# Patient Record
Sex: FEMALE | Race: ASIAN | NOT HISPANIC OR LATINO | URBAN - METROPOLITAN AREA
[De-identification: names, ages, dates, MRNs, and addresses within clinical notes are randomized per-mention and may not be internally consistent; named-entity substitution may affect disease eponyms.]

---

## 2017-01-03 ENCOUNTER — AMBULATORY - HEALTHEAST (OUTPATIENT)
Dept: LAB | Facility: CLINIC | Age: 27
End: 2017-01-03

## 2017-01-03 ENCOUNTER — COMMUNICATION - HEALTHEAST (OUTPATIENT)
Dept: FAMILY MEDICINE | Facility: CLINIC | Age: 27
End: 2017-01-03

## 2017-01-03 DIAGNOSIS — O03.9 MISCARRIAGE: ICD-10-CM

## 2017-06-26 ENCOUNTER — COMMUNICATION - HEALTHEAST (OUTPATIENT)
Dept: FAMILY MEDICINE | Facility: CLINIC | Age: 27
End: 2017-06-26

## 2017-06-29 ENCOUNTER — OFFICE VISIT - HEALTHEAST (OUTPATIENT)
Dept: FAMILY MEDICINE | Facility: CLINIC | Age: 27
End: 2017-06-29

## 2017-06-29 DIAGNOSIS — N92.6 LATE PERIOD: ICD-10-CM

## 2017-06-29 DIAGNOSIS — Z3A.01 LESS THAN 8 WEEKS GESTATION OF PREGNANCY: ICD-10-CM

## 2017-06-29 ASSESSMENT — MIFFLIN-ST. JEOR: SCORE: 1101.9

## 2017-07-31 ENCOUNTER — PRENATAL OFFICE VISIT - HEALTHEAST (OUTPATIENT)
Dept: MIDWIFE SERVICES | Facility: CLINIC | Age: 27
End: 2017-07-31

## 2017-07-31 DIAGNOSIS — Z34.00 SUPERVISION OF NORMAL FIRST PREGNANCY: ICD-10-CM

## 2017-07-31 LAB — HIV 1+2 AB+HIV1 P24 AG SERPL QL IA: NEGATIVE

## 2017-07-31 ASSESSMENT — MIFFLIN-ST. JEOR: SCORE: 1096.91

## 2017-08-01 LAB
HBV SURFACE AG SERPL QL IA: NEGATIVE
SYPHILIS RPR SCREEN - HISTORICAL: NORMAL

## 2017-08-28 ENCOUNTER — PRENATAL OFFICE VISIT - HEALTHEAST (OUTPATIENT)
Dept: MIDWIFE SERVICES | Facility: CLINIC | Age: 27
End: 2017-08-28

## 2017-08-28 DIAGNOSIS — Z34.02 ENCOUNTER FOR SUPERVISION OF NORMAL FIRST PREGNANCY IN SECOND TRIMESTER: ICD-10-CM

## 2017-08-28 ASSESSMENT — MIFFLIN-ST. JEOR: SCORE: 1096

## 2017-09-25 ENCOUNTER — PRENATAL OFFICE VISIT - HEALTHEAST (OUTPATIENT)
Dept: MIDWIFE SERVICES | Facility: CLINIC | Age: 27
End: 2017-09-25

## 2017-09-25 DIAGNOSIS — Z00.00 HEALTH CARE MAINTENANCE: ICD-10-CM

## 2017-09-25 DIAGNOSIS — Z34.02 ENCOUNTER FOR SUPERVISION OF NORMAL FIRST PREGNANCY IN SECOND TRIMESTER: ICD-10-CM

## 2017-09-25 ASSESSMENT — MIFFLIN-ST. JEOR: SCORE: 1110.97

## 2017-10-10 ENCOUNTER — HOSPITAL ENCOUNTER (OUTPATIENT)
Dept: ULTRASOUND IMAGING | Facility: HOSPITAL | Age: 27
Discharge: HOME | End: 2017-10-10
Attending: ADVANCED PRACTICE MIDWIFE

## 2017-10-10 DIAGNOSIS — Z34.02 ENCOUNTER FOR SUPERVISION OF NORMAL FIRST PREGNANCY IN SECOND TRIMESTER: ICD-10-CM

## 2017-10-11 ENCOUNTER — AMBULATORY - HEALTHEAST (OUTPATIENT)
Dept: MIDWIFE SERVICES | Facility: CLINIC | Age: 27
End: 2017-10-11

## 2017-10-23 ENCOUNTER — PRENATAL OFFICE VISIT - HEALTHEAST (OUTPATIENT)
Dept: MIDWIFE SERVICES | Facility: CLINIC | Age: 27
End: 2017-10-23

## 2017-10-23 DIAGNOSIS — Z34.02 ENCOUNTER FOR SUPERVISION OF NORMAL FIRST PREGNANCY IN SECOND TRIMESTER: ICD-10-CM

## 2017-10-23 ASSESSMENT — MIFFLIN-ST. JEOR: SCORE: 1152.7

## 2017-11-20 ENCOUNTER — PRENATAL OFFICE VISIT - HEALTHEAST (OUTPATIENT)
Dept: MIDWIFE SERVICES | Facility: CLINIC | Age: 27
End: 2017-11-20

## 2017-11-20 DIAGNOSIS — Z34.02 ENCOUNTER FOR SUPERVISION OF NORMAL FIRST PREGNANCY IN SECOND TRIMESTER: ICD-10-CM

## 2017-11-20 ASSESSMENT — MIFFLIN-ST. JEOR: SCORE: 1184.45

## 2017-12-04 ENCOUNTER — PRENATAL OFFICE VISIT - HEALTHEAST (OUTPATIENT)
Dept: MIDWIFE SERVICES | Facility: CLINIC | Age: 27
End: 2017-12-04

## 2017-12-04 DIAGNOSIS — Z34.03 ENCOUNTER FOR SUPERVISION OF NORMAL FIRST PREGNANCY IN THIRD TRIMESTER: ICD-10-CM

## 2017-12-04 ASSESSMENT — MIFFLIN-ST. JEOR: SCORE: 1192.16

## 2017-12-06 LAB — SYPHILIS RPR SCREEN - HISTORICAL: NORMAL

## 2018-01-08 ENCOUNTER — PRENATAL OFFICE VISIT - HEALTHEAST (OUTPATIENT)
Dept: MIDWIFE SERVICES | Facility: CLINIC | Age: 28
End: 2018-01-08

## 2018-01-08 DIAGNOSIS — Z34.03 ENCOUNTER FOR SUPERVISION OF NORMAL FIRST PREGNANCY IN THIRD TRIMESTER: ICD-10-CM

## 2018-01-08 ASSESSMENT — MIFFLIN-ST. JEOR: SCORE: 1210.31

## 2018-01-22 ENCOUNTER — PRENATAL OFFICE VISIT - HEALTHEAST (OUTPATIENT)
Dept: MIDWIFE SERVICES | Facility: CLINIC | Age: 28
End: 2018-01-22

## 2018-01-22 DIAGNOSIS — Z34.03 ENCOUNTER FOR SUPERVISION OF NORMAL FIRST PREGNANCY IN THIRD TRIMESTER: ICD-10-CM

## 2018-01-22 ASSESSMENT — MIFFLIN-ST. JEOR: SCORE: 1232.99

## 2018-01-29 ENCOUNTER — COMMUNICATION - HEALTHEAST (OUTPATIENT)
Dept: OBGYN | Facility: CLINIC | Age: 28
End: 2018-01-29

## 2018-01-29 ENCOUNTER — PRENATAL OFFICE VISIT - HEALTHEAST (OUTPATIENT)
Dept: MIDWIFE SERVICES | Facility: CLINIC | Age: 28
End: 2018-01-29

## 2018-01-29 DIAGNOSIS — Z34.03 ENCOUNTER FOR SUPERVISION OF NORMAL FIRST PREGNANCY IN THIRD TRIMESTER: ICD-10-CM

## 2018-01-29 LAB — HGB BLD-MCNC: 12.5 G/DL (ref 12–16)

## 2018-01-29 ASSESSMENT — MIFFLIN-ST. JEOR: SCORE: 1231.63

## 2018-01-31 LAB
ALLERGIC TO PENICILLIN: NO
GP B STREP DNA SPEC QL NAA+PROBE: NEGATIVE

## 2018-02-05 ENCOUNTER — PRENATAL OFFICE VISIT - HEALTHEAST (OUTPATIENT)
Dept: MIDWIFE SERVICES | Facility: CLINIC | Age: 28
End: 2018-02-05

## 2018-02-05 ENCOUNTER — COMMUNICATION - HEALTHEAST (OUTPATIENT)
Dept: OBGYN | Facility: CLINIC | Age: 28
End: 2018-02-05

## 2018-02-05 ENCOUNTER — COMMUNICATION - HEALTHEAST (OUTPATIENT)
Dept: MIDWIFE SERVICES | Facility: CLINIC | Age: 28
End: 2018-02-05

## 2018-02-05 DIAGNOSIS — Z34.83 ENCOUNTER FOR SUPERVISION OF OTHER NORMAL PREGNANCY IN THIRD TRIMESTER: ICD-10-CM

## 2018-02-05 ASSESSMENT — MIFFLIN-ST. JEOR: SCORE: 1242.06

## 2018-02-09 ENCOUNTER — HOSPITAL ENCOUNTER (OUTPATIENT)
Dept: OBGYN | Facility: HOSPITAL | Age: 28
Discharge: HOME | End: 2018-02-09
Attending: ADVANCED PRACTICE MIDWIFE | Admitting: ADVANCED PRACTICE MIDWIFE

## 2018-02-09 ENCOUNTER — COMMUNICATION - HEALTHEAST (OUTPATIENT)
Dept: MIDWIFE SERVICES | Facility: CLINIC | Age: 28
End: 2018-02-09

## 2018-02-09 ENCOUNTER — COMMUNICATION - HEALTHEAST (OUTPATIENT)
Dept: OBGYN | Facility: CLINIC | Age: 28
End: 2018-02-09

## 2018-02-09 DIAGNOSIS — O99.012 ANEMIA AFFECTING PREGNANCY IN SECOND TRIMESTER: ICD-10-CM

## 2018-02-09 LAB — RUPTURE OF FETAL MEMBRANES BY ROM PLUS: POSITIVE

## 2018-02-09 ASSESSMENT — MIFFLIN-ST. JEOR: SCORE: 1242.06

## 2018-02-11 ENCOUNTER — HOME CARE/HOSPICE - HEALTHEAST (OUTPATIENT)
Dept: HOME HEALTH SERVICES | Facility: HOME HEALTH | Age: 28
End: 2018-02-11

## 2018-02-12 ENCOUNTER — HOME CARE/HOSPICE - HEALTHEAST (OUTPATIENT)
Dept: HOME HEALTH SERVICES | Facility: HOME HEALTH | Age: 28
End: 2018-02-12

## 2018-03-06 ENCOUNTER — COMMUNICATION - HEALTHEAST (OUTPATIENT)
Dept: MIDWIFE SERVICES | Facility: CLINIC | Age: 28
End: 2018-03-06

## 2018-03-28 ENCOUNTER — OFFICE VISIT - HEALTHEAST (OUTPATIENT)
Dept: MIDWIFE SERVICES | Facility: CLINIC | Age: 28
End: 2018-03-28

## 2018-03-28 ASSESSMENT — MIFFLIN-ST. JEOR: SCORE: 1151.34

## 2018-12-17 ENCOUNTER — COMMUNICATION - HEALTHEAST (OUTPATIENT)
Dept: FAMILY MEDICINE | Facility: CLINIC | Age: 28
End: 2018-12-17

## 2019-01-03 ENCOUNTER — OFFICE VISIT - HEALTHEAST (OUTPATIENT)
Dept: FAMILY MEDICINE | Facility: CLINIC | Age: 29
End: 2019-01-03

## 2019-01-03 ENCOUNTER — HOSPITAL ENCOUNTER (OUTPATIENT)
Dept: ULTRASOUND IMAGING | Facility: HOSPITAL | Age: 29
Discharge: HOME | End: 2019-01-03
Attending: NURSE PRACTITIONER

## 2019-01-03 DIAGNOSIS — N91.2 AMENORRHEA: ICD-10-CM

## 2019-01-03 DIAGNOSIS — Z3A.08 8 WEEKS GESTATION OF PREGNANCY: ICD-10-CM

## 2019-01-03 LAB — HCG UR QL: POSITIVE

## 2019-01-03 ASSESSMENT — MIFFLIN-ST. JEOR: SCORE: 1115.05

## 2019-01-21 ENCOUNTER — PRENATAL OFFICE VISIT - HEALTHEAST (OUTPATIENT)
Dept: MIDWIFE SERVICES | Facility: CLINIC | Age: 29
End: 2019-01-21

## 2019-01-21 DIAGNOSIS — Z34.81 ENCOUNTER FOR SUPERVISION OF OTHER NORMAL PREGNANCY IN FIRST TRIMESTER: ICD-10-CM

## 2019-01-21 LAB
BASOPHILS # BLD AUTO: 0 THOU/UL (ref 0–0.2)
BASOPHILS NFR BLD AUTO: 1 % (ref 0–2)
EOSINOPHIL # BLD AUTO: 0.1 THOU/UL (ref 0–0.4)
EOSINOPHIL NFR BLD AUTO: 1 % (ref 0–6)
ERYTHROCYTE [DISTWIDTH] IN BLOOD BY AUTOMATED COUNT: 11.4 % (ref 11–14.5)
HCT VFR BLD AUTO: 39.6 % (ref 35–47)
HGB BLD-MCNC: 13.6 G/DL (ref 12–16)
HIV 1+2 AB+HIV1 P24 AG SERPL QL IA: NEGATIVE
LYMPHOCYTES # BLD AUTO: 1.9 THOU/UL (ref 0.8–4.4)
LYMPHOCYTES NFR BLD AUTO: 24 % (ref 20–40)
MCH RBC QN AUTO: 29.1 PG (ref 27–34)
MCHC RBC AUTO-ENTMCNC: 34.3 G/DL (ref 32–36)
MCV RBC AUTO: 85 FL (ref 80–100)
MONOCYTES # BLD AUTO: 0.3 THOU/UL (ref 0–0.9)
MONOCYTES NFR BLD AUTO: 4 % (ref 2–10)
NEUTROPHILS # BLD AUTO: 5.5 THOU/UL (ref 2–7.7)
NEUTROPHILS NFR BLD AUTO: 70 % (ref 50–70)
PLATELET # BLD AUTO: 248 THOU/UL (ref 140–440)
PMV BLD AUTO: 8.6 FL (ref 8.5–12.5)
RBC # BLD AUTO: 4.67 MILL/UL (ref 3.8–5.4)
WBC: 7.8 THOU/UL (ref 4–11)

## 2019-01-21 ASSESSMENT — MIFFLIN-ST. JEOR: SCORE: 1115.05

## 2019-01-22 ENCOUNTER — COMMUNICATION - HEALTHEAST (OUTPATIENT)
Dept: MIDWIFE SERVICES | Facility: CLINIC | Age: 29
End: 2019-01-22

## 2019-01-22 LAB
ABO/RH(D): NORMAL
ABORH REPEAT: NORMAL
ANTIBODY SCREEN: NEGATIVE
BACTERIA SPEC CULT: NO GROWTH
HBV SURFACE AG SERPL QL IA: NEGATIVE
RUBV IGG SERPL QL IA: NEGATIVE
T PALLIDUM AB SER QL: NEGATIVE

## 2019-01-23 LAB
HEMOGLOBIN A2 QUANTITATION: 3.7 % (ref 2.2–3.5)
HEMOGLOBIN ELECTROPHRESIS: ABNORMAL
HEMOGLOBIN F QUANTITATION: <0.8 % (ref 0–2)
PATH ICD:: ABNORMAL
REVIEWING PATHOLOGIST: ABNORMAL

## 2019-02-21 ENCOUNTER — PRENATAL OFFICE VISIT - HEALTHEAST (OUTPATIENT)
Dept: MIDWIFE SERVICES | Facility: CLINIC | Age: 29
End: 2019-02-21

## 2019-02-21 DIAGNOSIS — Z34.81 ENCOUNTER FOR SUPERVISION OF OTHER NORMAL PREGNANCY IN FIRST TRIMESTER: ICD-10-CM

## 2019-03-28 ENCOUNTER — AMBULATORY - HEALTHEAST (OUTPATIENT)
Dept: MIDWIFE SERVICES | Facility: CLINIC | Age: 29
End: 2019-03-28

## 2019-03-28 ENCOUNTER — PRENATAL OFFICE VISIT - HEALTHEAST (OUTPATIENT)
Dept: MIDWIFE SERVICES | Facility: CLINIC | Age: 29
End: 2019-03-28

## 2019-03-28 DIAGNOSIS — Z34.81 ENCOUNTER FOR SUPERVISION OF OTHER NORMAL PREGNANCY IN FIRST TRIMESTER: ICD-10-CM

## 2019-03-28 ASSESSMENT — MIFFLIN-ST. JEOR: SCORE: 1142.27

## 2019-04-11 ENCOUNTER — HOSPITAL ENCOUNTER (OUTPATIENT)
Dept: ULTRASOUND IMAGING | Facility: HOSPITAL | Age: 29
Discharge: HOME | End: 2019-04-11
Attending: ADVANCED PRACTICE MIDWIFE

## 2019-04-11 ENCOUNTER — AMBULATORY - HEALTHEAST (OUTPATIENT)
Dept: OBGYN | Facility: CLINIC | Age: 29
End: 2019-04-11

## 2019-04-30 ENCOUNTER — COMMUNICATION - HEALTHEAST (OUTPATIENT)
Dept: MIDWIFE SERVICES | Facility: CLINIC | Age: 29
End: 2019-04-30

## 2019-05-09 ENCOUNTER — PRENATAL OFFICE VISIT - HEALTHEAST (OUTPATIENT)
Dept: MIDWIFE SERVICES | Facility: CLINIC | Age: 29
End: 2019-05-09

## 2019-05-09 DIAGNOSIS — Z34.81 ENCOUNTER FOR SUPERVISION OF OTHER NORMAL PREGNANCY IN FIRST TRIMESTER: ICD-10-CM

## 2019-05-09 DIAGNOSIS — O26.02 EXCESSIVE WEIGHT GAIN DURING PREGNANCY IN SECOND TRIMESTER: ICD-10-CM

## 2019-05-09 DIAGNOSIS — Z34.83 ENCOUNTER FOR SUPERVISION OF OTHER NORMAL PREGNANCY IN THIRD TRIMESTER: ICD-10-CM

## 2019-05-09 LAB
FASTING STATUS PATIENT QL REPORTED: ABNORMAL
GLUCOSE 1H P 50 G GLC PO SERPL-MCNC: 143 MG/DL (ref 70–139)
HGB BLD-MCNC: 12 G/DL (ref 12–16)

## 2019-05-10 ENCOUNTER — COMMUNICATION - HEALTHEAST (OUTPATIENT)
Dept: ADMINISTRATIVE | Facility: CLINIC | Age: 29
End: 2019-05-10

## 2019-05-10 ENCOUNTER — AMBULATORY - HEALTHEAST (OUTPATIENT)
Dept: MIDWIFE SERVICES | Facility: CLINIC | Age: 29
End: 2019-05-10

## 2019-05-10 ENCOUNTER — COMMUNICATION - HEALTHEAST (OUTPATIENT)
Dept: OBGYN | Facility: CLINIC | Age: 29
End: 2019-05-10

## 2019-05-10 DIAGNOSIS — O99.810 ABNORMAL MATERNAL GLUCOSE TOLERANCE, ANTEPARTUM: ICD-10-CM

## 2019-05-10 LAB — T PALLIDUM AB SER QL: NEGATIVE

## 2019-05-13 ENCOUNTER — COMMUNICATION - HEALTHEAST (OUTPATIENT)
Dept: MIDWIFE SERVICES | Facility: CLINIC | Age: 29
End: 2019-05-13

## 2019-05-14 ENCOUNTER — COMMUNICATION - HEALTHEAST (OUTPATIENT)
Dept: ADMINISTRATIVE | Facility: CLINIC | Age: 29
End: 2019-05-14

## 2019-05-23 ENCOUNTER — PRENATAL OFFICE VISIT - HEALTHEAST (OUTPATIENT)
Dept: MIDWIFE SERVICES | Facility: CLINIC | Age: 29
End: 2019-05-23

## 2019-05-23 DIAGNOSIS — Z34.81 ENCOUNTER FOR SUPERVISION OF OTHER NORMAL PREGNANCY IN FIRST TRIMESTER: ICD-10-CM

## 2019-05-23 ASSESSMENT — MIFFLIN-ST. JEOR: SCORE: 1201.24

## 2019-05-24 ENCOUNTER — AMBULATORY - HEALTHEAST (OUTPATIENT)
Dept: LAB | Facility: CLINIC | Age: 29
End: 2019-05-24

## 2019-05-24 DIAGNOSIS — O99.810 ABNORMAL MATERNAL GLUCOSE TOLERANCE, ANTEPARTUM: ICD-10-CM

## 2019-05-24 LAB
FASTING STATUS PATIENT QL REPORTED: YES
GLUCOSE 1H P 100 G GLC PO SERPL-MCNC: 117 MG/DL
GLUCOSE 2H P 100 G GLC PO SERPL-MCNC: 102 MG/DL
GLUCOSE 3H P 100 G GLC PO SERPL-MCNC: 102 MG/DL
GLUCOSE P FAST SERPL-MCNC: 76 MG/DL

## 2019-06-18 ENCOUNTER — PRENATAL OFFICE VISIT - HEALTHEAST (OUTPATIENT)
Dept: MIDWIFE SERVICES | Facility: CLINIC | Age: 29
End: 2019-06-18

## 2019-06-18 DIAGNOSIS — Z34.83 ENCOUNTER FOR SUPERVISION OF OTHER NORMAL PREGNANCY IN THIRD TRIMESTER: ICD-10-CM

## 2019-06-18 ASSESSMENT — MIFFLIN-ST. JEOR: SCORE: 1223.92

## 2019-06-20 ENCOUNTER — COMMUNICATION - HEALTHEAST (OUTPATIENT)
Dept: MIDWIFE SERVICES | Facility: CLINIC | Age: 29
End: 2019-06-20

## 2019-07-02 ENCOUNTER — PRENATAL OFFICE VISIT - HEALTHEAST (OUTPATIENT)
Dept: MIDWIFE SERVICES | Facility: CLINIC | Age: 29
End: 2019-07-02

## 2019-07-02 DIAGNOSIS — Z34.83 ENCOUNTER FOR SUPERVISION OF OTHER NORMAL PREGNANCY IN THIRD TRIMESTER: ICD-10-CM

## 2019-07-02 ASSESSMENT — MIFFLIN-ST. JEOR: SCORE: 1231.17

## 2019-07-10 ENCOUNTER — COMMUNICATION - HEALTHEAST (OUTPATIENT)
Dept: MIDWIFE SERVICES | Facility: CLINIC | Age: 29
End: 2019-07-10

## 2019-07-12 ENCOUNTER — HOSPITAL ENCOUNTER (OUTPATIENT)
Dept: OBGYN | Facility: HOSPITAL | Age: 29
Discharge: HOME | End: 2019-07-12
Attending: ADVANCED PRACTICE MIDWIFE | Admitting: ADVANCED PRACTICE MIDWIFE

## 2019-07-12 ENCOUNTER — OFFICE VISIT - HEALTHEAST (OUTPATIENT)
Dept: FAMILY MEDICINE | Facility: CLINIC | Age: 29
End: 2019-07-12

## 2019-07-12 DIAGNOSIS — R21 RASH: ICD-10-CM

## 2019-07-12 ASSESSMENT — MIFFLIN-ST. JEOR: SCORE: 1228.45

## 2019-07-15 ENCOUNTER — OFFICE VISIT - HEALTHEAST (OUTPATIENT)
Dept: FAMILY MEDICINE | Facility: CLINIC | Age: 29
End: 2019-07-15

## 2019-07-15 DIAGNOSIS — R21 RASH AND NONSPECIFIC SKIN ERUPTION: ICD-10-CM
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
## 2019-07-15 DIAGNOSIS — L03.90 CELLULITIS, UNSPECIFIED CELLULITIS SITE: ICD-10-CM

## 2019-07-16 ENCOUNTER — PRENATAL OFFICE VISIT - HEALTHEAST (OUTPATIENT)
Dept: MIDWIFE SERVICES | Facility: CLINIC | Age: 29
End: 2019-07-16

## 2019-07-16 DIAGNOSIS — Z34.83 ENCOUNTER FOR SUPERVISION OF OTHER NORMAL PREGNANCY IN THIRD TRIMESTER: ICD-10-CM

## 2019-07-16 DIAGNOSIS — B02.9 HERPES ZOSTER WITHOUT COMPLICATION: ICD-10-CM

## 2019-07-16 LAB — HGB BLD-MCNC: 11.8 G/DL (ref 12–16)

## 2019-07-16 ASSESSMENT — MIFFLIN-ST. JEOR: SCORE: 1251.13

## 2019-07-18 LAB
ALLERGIC TO PENICILLIN: NO
GP B STREP DNA SPEC QL NAA+PROBE: NEGATIVE

## 2019-07-23 ENCOUNTER — PRENATAL OFFICE VISIT - HEALTHEAST (OUTPATIENT)
Dept: MIDWIFE SERVICES | Facility: CLINIC | Age: 29
End: 2019-07-23

## 2019-07-23 DIAGNOSIS — Z34.83 ENCOUNTER FOR SUPERVISION OF OTHER NORMAL PREGNANCY IN THIRD TRIMESTER: ICD-10-CM

## 2019-07-23 DIAGNOSIS — B02.9 HERPES ZOSTER WITHOUT COMPLICATION: ICD-10-CM

## 2019-07-23 DIAGNOSIS — Z87.59 HISTORY OF LACTATION DISORDER: ICD-10-CM

## 2019-07-30 ENCOUNTER — PRENATAL OFFICE VISIT - HEALTHEAST (OUTPATIENT)
Dept: MIDWIFE SERVICES | Facility: CLINIC | Age: 29
End: 2019-07-30

## 2019-07-30 DIAGNOSIS — Z34.83 ENCOUNTER FOR SUPERVISION OF OTHER NORMAL PREGNANCY IN THIRD TRIMESTER: ICD-10-CM

## 2019-07-30 ASSESSMENT — MIFFLIN-ST. JEOR: SCORE: 1263.83

## 2019-08-07 ENCOUNTER — PRENATAL OFFICE VISIT - HEALTHEAST (OUTPATIENT)
Dept: MIDWIFE SERVICES | Facility: CLINIC | Age: 29
End: 2019-08-07

## 2019-08-07 DIAGNOSIS — Z34.83 ENCOUNTER FOR SUPERVISION OF OTHER NORMAL PREGNANCY IN THIRD TRIMESTER: ICD-10-CM

## 2019-08-07 ASSESSMENT — MIFFLIN-ST. JEOR: SCORE: 1264.74

## 2019-08-13 ENCOUNTER — PRENATAL OFFICE VISIT - HEALTHEAST (OUTPATIENT)
Dept: MIDWIFE SERVICES | Facility: CLINIC | Age: 29
End: 2019-08-13

## 2019-08-13 DIAGNOSIS — O42.90 LEAKAGE OF AMNIOTIC FLUID: ICD-10-CM

## 2019-08-13 DIAGNOSIS — Z34.83 ENCOUNTER FOR SUPERVISION OF OTHER NORMAL PREGNANCY IN THIRD TRIMESTER: ICD-10-CM

## 2019-08-13 LAB — RUPTURE OF FETAL MEMBRANES BY ROM PLUS: POSITIVE

## 2019-08-13 ASSESSMENT — MIFFLIN-ST. JEOR: SCORE: 1269.28

## 2019-08-20 ENCOUNTER — COMMUNICATION - HEALTHEAST (OUTPATIENT)
Dept: OBGYN | Facility: CLINIC | Age: 29
End: 2019-08-20

## 2019-09-24 ENCOUNTER — OFFICE VISIT - HEALTHEAST (OUTPATIENT)
Dept: MIDWIFE SERVICES | Facility: CLINIC | Age: 29
End: 2019-09-24

## 2019-09-24 DIAGNOSIS — Z12.4 PAP SMEAR FOR CERVICAL CANCER SCREENING: ICD-10-CM

## 2019-09-24 ASSESSMENT — MIFFLIN-ST. JEOR: SCORE: 1183.09

## 2019-09-24 ASSESSMENT — EDINBURGH POSTNATAL DEPRESSION SCALE (EPDS): TOTAL SCORE: 0

## 2019-09-25 LAB
HPV SOURCE: NORMAL
HUMAN PAPILLOMA VIRUS 16 DNA: NEGATIVE
HUMAN PAPILLOMA VIRUS 18 DNA: NEGATIVE
HUMAN PAPILLOMA VIRUS FINAL DIAGNOSIS: NORMAL
HUMAN PAPILLOMA VIRUS OTHER HR: NEGATIVE
SPECIMEN DESCRIPTION: NORMAL

## 2019-10-02 LAB
BKR LAB AP ABNORMAL BLEEDING: NO
BKR LAB AP BIRTH CONTROL/HORMONES: NORMAL
BKR LAB AP CERVICAL APPEARANCE: NORMAL
BKR LAB AP GYN ADEQUACY: NORMAL
BKR LAB AP GYN INTERPRETATION: NORMAL
BKR LAB AP HPV REFLEX: NORMAL
BKR LAB AP LMP: NORMAL
BKR LAB AP PATIENT STATUS: NORMAL
BKR LAB AP PREVIOUS ABNORMAL: NO
BKR LAB AP PREVIOUS NORMAL: NORMAL
HIGH RISK?: NO
PATH REPORT.COMMENTS IMP SPEC: NORMAL
RESULT FLAG (HE HISTORICAL CONVERSION): NORMAL

## 2021-05-27 NOTE — PROGRESS NOTES
LVM for pt to call for results or read MC message.  Will need FU US in 1-2 weeks to visualize face.  Otherwise normal FAS.

## 2021-05-27 NOTE — PATIENT INSTRUCTIONS - HE
"  Patient Education   HEALTHY PREGNANCY CARE: 18-22 WEEKS PREGNANT    Your baby is continuing to develop quickly. At this stage, babies can now suck their thumbs,  firmly with their hands, and are beginning to hear.    Sometime between 18 and 22 weeks, you will start to feel your baby move. At first, these small fetal movements feel like fluttering or \"butterflies.\" Some women say that they feel like gas bubbles. As the baby grows, these movements will become stronger and be able to be felt through your abdomen.     Nutrition: During this time, you may find that your nausea and fatigue are gone. Overall, you may feel better and have more energy than you did in your first trimester. Be sure you are getting enough calcium and iron in your diet. Your prenatal vitamins cannot supply all of the nutrients you need, so continue to eat 3-4 servings of dairy foods and 2-3 servings of meat/fish/poultry/nuts every day. Foods high in iron include: red meats, eggs, dark green vegetables, dark yellow vegetables, nuts, kidney beans and chickpeas. Some cereals are fortified with iron, so look at the food labels for 100% of the daily requirement for iron.     [place] for childbirth and parenting classes, including an infant CPR class. Breastfeeding classes are recommended too.    Plan for the gestational diabetes screening between weeks 24-28. You can eat normally before that visit; we would suggest making sure you have protein foods, but not a lot of carbohydrates or sugary foods.    Your blood type was determined at your first OB appointment. If you are Rh negative, you should discuss the need for a Rhogam shot with your midwife or physician.     If you had a  birth in the past, discuss a trial of labor with your midwife or physician. He or she may ask that you obtain your operative report from that  if you are wanting to have a vaginal birth after  () this time.     Think about the support you " have, and what help you can plan on from family and friends, after your baby is born. Many mothers and babies are ready to go home from the hospital within a few days. Your clinic staff is available to assist you and the [facility] staff is available to you after hours. Start preparing your other children for changes they'll experience with the new baby. Explore day care options.    You may find that you have new discomforts now, such as sleep problems or leg cramps. To access information that can help you ease these discomforts, you can refer to the Starting Out Right book or find it online at http://www.healthRotten Tomatoes.org/images/stories/maternity/[facility]-Starting-Out-Right.pdf or http://www.healthRotten Tomatoes.org/images/stories/flipbooks/[facility]-starting-out-right/[facility]-starting-out-right.html#p=8    You can sign up for a weekly parenting e-mail that gives support, tips and advice from health care professionals that starts with pregnancy and continues through the toddler years. To register, go to [email or web address] at any time during your pregnancy.    Watch for the warning signs of premature labor:     Dull, low backache    Contractions of the uterus, menstrual-like cramps    Abdominal cramping with or without diarrhea    More pelvic pressure    Increase or change in vaginal discharge.     Remember that labor doesn't have to hurt. Never hesitate to call your midwife or physician or their staff [facility] at Phone: [phone] for any one of these warning signs - or if something just doesn't feel right. If it's after clinic hours, physician patients should call the [facility] at [phone] ([phone]); midwife patients should call their answering service at [phone].     Patient Education   HEALTHY PREGNANCY CARE: 22-26 WEEKS PREGNANT    You are finishing your second trimester. Your baby is developing rapidly. At this stage, babies have a sense of balance, can respond to touch, and are  recognizing parent voices.  Your baby will be moving around more now.  You may notice B[place]-Ramirez contractions now, which are painless and prepare the uterus for the delivery.    Nutrition: During this time, you may find that your nausea and fatigue are gone. Overall, you may feel better and have more energy than you did in your first trimester. Be sure you are getting enough calcium and iron in your diet. Your prenatal vitamins cannot supply all of the nutrients you need, so continue to eat 3-4 servings of dairy foods and 2-3 servings of meat/fish/poultry/nuts every day. Foods high in iron include: red meats, eggs, dark green vegetables, dark yellow vegetables, nuts, kidney beans and chickpeas. Some cereals are fortified with iron, so look at the food labels for 100% of the daily requirement for iron.     Discuss your work situation with your midwife or physician as needed. If you stand for long periods of time, you may need to make changes and take breaks.    [place] for childbirth and parenting classes, including an infant CPR class. Breastfeeding classes are recommended too.    Plan for the gestational diabetes screening between weeks 24-28. You can eat normally before that visit; we would suggest making sure you have protein foods, but not a lot of carbohydrates or sugary foods.    Your blood type was determined at your first OB appointment. If you are Rh negative, you should discuss the need for a Rhogam shot with your midwife or physician. This would be administered around 28 weeks if necessary.    How can you care for yourself at home?   You can refer to the Starting Out Right book or find it online at http://www.[facility].org/images/stories/maternity/[facility]-Starting-Out-Right.pdf or http://www.[facility].org/images/stories/flipbooks/[facility]-starting-out-right/[facility]-starting-out-right.html#p=8     You can sign up for a weekly parenting e-mail that gives support, tips and advice from health  "care professionals that starts with pregnancy and continues through the toddler years. To register, go to [email or web address] at any time during your pregnancy.    Watch for the warning signs of premature labor:   \" Dull, low backache  \" Contractions of the uterus, menstrual-like cramps  \" Abdominal cramping with or without diarrhea  \" More pelvic pressure  \" Increase or change in vaginal discharge.     Continue to watch for signs and symptoms of preeclampsia:   \" Sudden swelling of your face, hands, or feet   \" New vision problems such as blurring, double vision, or flashing lights  \" A severe headache not relieved with acetaminophen (Tylenol)  \" Sharp or stabbing pain in your right or middle upper abdomen    Remember that labor doesn't have to hurt. Never hesitate to call your midwife or physician or their staff at [facility] at Phone: [phone] for any one of these warning signs - or if something just doesn't feel right. If it's after clinic hours, physician patients should call the [facility] at [phone] ([phone]); midwife patients should call their answering service at [phone].      Baby Feeding in the Hospital: Information, Support and Resources    As you prepare for the birth of your child, you will want to consider options for feeding your baby including breast-feeding and/or baby formula. The American Academy of Pediatrics recommends exclusive breast-feeding for the first six months (although any amount of breast-feeding is beneficial).  However, we also understand that breast-feeding is a personal choice and not for everyone. Whether or not you choose to breast-feed, your decision will be respected by our staff.    There are numerous benefits of breast-feeding; here are a few to consider:    Provides antibodies to protect your baby from infections and diseases    The cost: formula can cost over $1,500 per year    Convenience, no warming up or sterilizing bottles and " supplies    The physical contact with breastfeeding can make babies feel secure, warm and comforted    What ever my feeding choice, what can I expect after I deliver my baby?    Your baby will usually be placed skin-to-skin immediately following birth. The skin to skin contact between you and your baby will be a special and memorable time. The bonding and attachment comforts your baby and has a positive effect on baby s brain development.     Having your baby  room in  with you also helps you start to learn your baby s body rhythms and sleep cycle.      You will also begin to learn your baby s cues (signals) that he or she is ready to feed.    When do I start to feed my baby?  As soon as possible after your baby s birth, you will be encouraged to begin feeding.  In the first couple of weeks, your baby will eat often.  Breastfeeding babies usually eat at least 8 times in 24 hours.  Babies fed formula usually eat at least 7 times in 24 hours.      Breast-feeding tips:    Get comfortable and use pillows for support.    Have your baby at the level of your breast, facing you,  tummy to tummy .      Touch your nipple to your baby s lips so you baby s mouth opens wide (rooting reflex).  Aim the nipple toward the roof of your baby s mouth. When your baby opens his or her mouth, pull your baby toward your breast to help your baby  latch on  to your nipple and much of the areola area.    Hand expressing your breast milk can assist with latching your baby to your breast, if needed.    Ask for help, breastfeeding may seem awkward or uncomfortable at first, this is normal. There are numerous resources available at [facility], Clinics and beyond.     If your goal is to exclusively breastfeed, avoid using any formula or artificial nipples (including bottles and pacifiers) while you are your baby are learning to breastfeed unless there is a medical reason.       Mixing breastfeeding and formula can interfere with how you  begin building your milk supply.  It can impact how you and your baby  learn  to breastfeeding together and alter the natural growth of  good  bacteria in your baby s stomach.    Delay a pacifier or a bottle in the first few weeks until breastfeeding is well established. This is often around 3 weeks of age.    Ask your nurse to show you how to hand express.   Breast milk can be kept in the refrigerator or freezer for later use.    Hospital Resources:  [facility] Lactation Clinics located at [facility], [facility] and [facility]  Call: [phone].    Inpatient support    Outpatient appointments    Telephone consultation    Breast-feeding classes available through [employer]      Online Resources:    [employer].[email or web address]/baby sign up for free online weekly e-mail    [employer].org/maternity    Breastfeedingmadesimple.com    ShirleneSina.org (La Leche League)    Normalfed.com    Womenshealth.gov/breastfeeding    Workandpump.com    Breast-feeding Supplies & Pumps:  Talk to your insurance provider or WIC (Women, Infants and Children) to learn more about options available to you. Recent health insurance changes may include additional coverage for supplies and pumps.    Public Health:  Women, Infants and Children Nutrition program (WIC): provides breast-feeding support and education in addition to formal feeding moms. 588-FLL-6670 or http://www.health.[facility].mn.us/divs/fh/wic    Family Health Home Visiting: Public [facility] Nurse home visits are available. Talk to your provider to see if you qualify. Most [facility] have a program available.    Additional Resources:  La Leche League is an international, nonprofit, nonsectarian organization offering information, education, and support to mothers who want to breast-feed their babies. Local groups offer phone help and monthly meetings. Visit [employer] or [employer].org and us the  Find local support  drop down menu or click on the  Resources   tab.    [place] Breastfeeding Resources: 3-657-970-BABY (4375) toll free    National Breastfeeding Help Line trained breastfeeding peer counselors can help answer common breast-feeding questions by phone. Monday-Friday: English/German  [phone]- [phone] toll free, [phone] (TTY)    [facility] Connection: [phone] ([phone])

## 2021-05-27 NOTE — PROGRESS NOTES
Barbara Aguirre presents to clinic with her partner and young daughter. She reports active fetal movement X 2 weeks.  Patient has just come from her anatomy scan.  Results not yet posted at the time of our visit.  I let her know that the midwife will call her with any abnormal results.  Weight gain within recommended limits.  Patient has been active and I encouraged her to continue to be active and eat healthfully.  Pregnancy checklist updated.  They attended childbirth education classes during her last pregnancy and do not plan to repeat this pregnancy.  Warning s/sx reviewed with patient.  I recommended she call the CNM on-call at [phone] in the case that she has greater than 6 regular contractions in one hour, leaking of watery fluid from her vagina, bright red vaginal bleeding, or feels her baby moving less or doesn't feel her baby moving normally.  Return to clinic in 4-6 weeks.          Had anatomy scan today.    Active baby

## 2021-05-28 NOTE — TELEPHONE ENCOUNTER
Forms faxed per patient's request to [phone]. Hard copy is in the basket next to the fax machine in the [facility] Pod.

## 2021-05-28 NOTE — TELEPHONE ENCOUNTER
Forms faxed to Barbara Zolaura to the number provided.  Hard copy is in the basket next to the fax machine.

## 2021-05-28 NOTE — PROGRESS NOTES
Barbara Aguirre presents to clinic by herself today.  She reports active fetal movement, denies concerns.    Reviewed anatomy scan at today's visit as it was not yet available at her last clinic visit. Questions answered.   Allergies have been bothersome for her, she has been taking Cetirizine with some relief.  Weight gain reviewed with patient.  She is above the recommended limit as of this visit.  Discussed increasing exercise and eating healthful foods. 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 Encouraged her to raise her heart rate x 30 minutes on most days.   Patient plans 12 weeks off following the birth.   Pregnancy checklist updated  She plans to breast feed.  She shares her past experience about her first born having jaundice and supplementing. Discussed the availabilty of lactation consultants within the [facility] practice and that each experience and baby are different.   Warning s/sx reviewed with patient, reviewed s/sx of PTL. GCT, hgb, and RPR today.  Too early for Tdap.  Return to clinic in 2 weeks.  Tdap ok at that time.

## 2021-05-28 NOTE — TELEPHONE ENCOUNTER
LM on home number. The FMLA form that was dropped off has been filled out. Need to know if she would like to pick them up, have them mailed, or get them at her next visit. The form is in the bin next to the fax machine in the [facility] midwife core.

## 2021-05-28 NOTE — PATIENT INSTRUCTIONS - HE
Patient Education   HEALTHY PREGNANCY CARE: 26-30 WEEKS PREGNANT    You are now in your last trimester of pregnancy. Your baby is growing rapidly, can open and close her eyelids, sometimes get hiccups, and you'll probably feel her moving around more often. Your baby has breathing movements and is gaining about one ounce each day. You may notice heartburn and leg cramps. Your back may ache as your body gets used to your baby's size and length.    Discuss your work situation with your midwife or physician as needed. If you stand for long periods of time, you may need to make changes and take breaks.    Pre-register online at the hospital where your baby will be born ([email or web address].org/maternity/maternity-care-pre-[email or web address].html)     Be aware of your baby's activity level. You may be asked to do daily fetal movement counts. Contact your midwife or physician about any decreased movement.    You may have been tested for gestational diabetes today. If you are RH negative, you may have had an additional test and a Rhogam injection.    Consider receiving a Tdap vaccination to protect your baby from Pertussis/whooping cough.    If you are considering tubal ligation, discuss this with your midwife or physician. You will need to have an appointment with the obstetrician who will do the surgery to discuss the risks, benefits, and alternatives, and to sign the consent. This can be discussed at your next visit.    Continue to watch for any signs or symptoms of premature labor:     Regular contractions. This means having about 6 or more within 1 hour, even after you have had a glass of water and are resting.     A backache that starts and stops regularly.    An increase or change in vaginal discharge, such as heavy, mucus-like, watery, or bloody discharge.     Your water breaks or leaks.    Continue to watch for signs and symptoms of preeclampsia:     Sudden swelling of your face, hands, or feet     New vision  problems such as blurring, double vision, or flashing lights    A severe headache not relieved with acetaminophen (Tylenol)    Sharp or stabbing pain in your right or middle upper abdomen    If you have any of the above symptoms or any other concerns, call your midwife or physician or their clinic staff at [facility] at Phone: [phone]. If it's after clinic hours, physician patients should call the [facility] at [phone] ([phone]); midwife patients should call their answering service at [phone].    How can you care for yourself at home?   You can refer to the Starting Out Right book or find it online at http://www.healthCoinapult.org/images/stories/maternity/[facility]-Starting-Out-Right.pdf or http://www.healthCoinapult.org/images/stories/flipbooks/[facility]-starting-out-right/[facility]-starting-out-right.html#p=8     You can sign up for a weekly parenting e-mail that gives support, tips and advice from health care professionals that starts with pregnancy and continues through the toddler years. To register, go to [email or web address] at any time during your pregnancy.      Baby Feeding in the Hospital: Information, Support and Resources    As you prepare for the birth of your child, you will want to consider options for feeding your baby including breast-feeding and/or baby formula. The American Academy of Pediatrics recommends exclusive breast-feeding for the first six months (although any amount of breast-feeding is beneficial).  However, we also understand that breast-feeding is a personal choice and not for everyone. Whether or not you choose to breast-feed, your decision will be respected by our staff.    There are numerous benefits of breast-feeding; here are a few to consider:    Provides antibodies to protect your baby from infections and diseases    The cost: formula can cost over $1,500 per year    Convenience, no warming up or sterilizing bottles and supplies    The physical contact  with breastfeeding can make babies feel secure, warm and comforted    What ever my feeding choice, what can I expect after I deliver my baby?    Your baby will usually be placed skin-to-skin immediately following birth. The skin to skin contact between you and your baby will be a special and memorable time. The bonding and attachment comforts your baby and has a positive effect on baby s brain development.     Having your baby  room in  with you also helps you start to learn your baby s body rhythms and sleep cycle.      You will also begin to learn your baby s cues (signals) that he or she is ready to feed.    When do I start to feed my baby?  As soon as possible after your baby s birth, you will be encouraged to begin feeding.  In the first couple of weeks, your baby will eat often.  Breastfeeding babies usually eat at least 8 times in 24 hours.  Babies fed formula usually eat at least 7 times in 24 hours.      Breast-feeding tips:    Get comfortable and use pillows for support.    Have your baby at the level of your breast, facing you,  tummy to tummy .      Touch your nipple to your baby s lips so you baby s mouth opens wide (rooting reflex).  Aim the nipple toward the roof of your baby s mouth. When your baby opens his or her mouth, pull your baby toward your breast to help your baby  latch on  to your nipple and much of the areola area.    Hand expressing your breast milk can assist with latching your baby to your breast, if needed.    Ask for help, breastfeeding may seem awkward or uncomfortable at first, this is normal. There are numerous resources available at [facility]als, Clinics and beyond.     If your goal is to exclusively breastfeed, avoid using any formula or artificial nipples (including bottles and pacifiers) while you are your baby are learning to breastfeed unless there is a medical reason.       Mixing breastfeeding and formula can interfere with how you begin building your milk supply.   It can impact how you and your baby  learn  to breastfeeding together and alter the natural growth of  good  bacteria in your baby s stomach.    Delay a pacifier or a bottle in the first few weeks until breastfeeding is well established. This is often around 3 weeks of age.    Ask your nurse to show you how to hand express.   Breast milk can be kept in the refrigerator or freezer for later use.  (over)  Hospital Resources:  [facility] Lactation Clinics located at [facility], [facility] and [facility]  Call: [phone].    Inpatient support    Outpatient appointments    Telephone consultation    Breast-feeding classes available through [employer]      Online Resources:    [employer].org/baby sign up for free online weekly e-mail    [employer].org/maternity    Breastfeedingmadesimple.com    Llli.org (La Leche League)    Normalfed.com    Womenshealth.gov/breastfeeding    Workandpump.com    Breast-feeding Supplies & Pumps:  Talk to your insurance provider or WIC (Women, Infants and Children) to learn more about options available to you. Recent health insurance changes may include additional coverage for supplies and pumps.    Public Health:  Women, Infants and Children Nutrition program (WIC): provides breast-feeding support and education in addition to formal feeding moms. 047-FAC-7003 or http://www.health.[facility].mn.us/divs/fh/wic    Family Health Home Visiting: Public Health Nurse home visits are available. Talk to your provider to see if you qualify. Most [facility] have a program available.    Additional Resources:  La Leche League is an international, nonprofit, nonsectarian organization offering information, education, and support to mothers who want to breast-feed their babies. Local groups offer phone help and monthly meetings. Visit [employer] (Closed) or SNTMNTli.org and us the  Find local support  drop down menu or click on the  Resources  tab.    [place] Breastfeeding  Resources: 1-076-125-BABY (4975) toll free    National Breastfeeding Help Line trained breastfeeding peer counselors can help answer common breast-feeding questions by phone. Monday-Friday: English/Citizen of [place]  [phone]- [phone] toll free, [phone] (TTY)    [facility] Care Connection: [phone] ([phone])

## 2021-05-28 NOTE — TELEPHONE ENCOUNTER
Pt was supposed to call back with how to get the paperwork to her. She would like it faxed today to her school fax# is [phone].

## 2021-05-28 NOTE — TELEPHONE ENCOUNTER
I am unclear regarding the forms I am to fill out.  Nothing has been sent to me.Will reach out to Aileen Willams.

## 2021-05-29 NOTE — PROGRESS NOTES
Barbara Aguirre presents to clinic by herself today.  She reports active fetal movement, denies new concerns.  She is looking forward to the end of school and having some time off in the summer.  Weight gain continues to be excessive with a 3 pound weight gain since her last visit approximately 2 weeks ago.  Encouraged increased activity and healthful eating as well as drinking 2 to 3 L of water daily.  Patient exceeded the limit of her 1 hour GCT and is scheduled for a 3 hr GTT tomorrow.  Pregnancy checklist updated.  Tdap received today.  Warning s/sx reviewed with patient.  I recommended she call the CN on-call at [phone] in the case that she has greater than 6 regular contractions in one hour, leaking of watery fluid from her vagina, bright red vaginal bleeding, or feels her baby moving less or doesn't feel her baby moving normally.  32-week checklist at next visit.  Return to clinic in 4 weeks.

## 2021-05-29 NOTE — PATIENT INSTRUCTIONS - HE
Patient Education   HEALTHY PREGNANCY CARE: 26-30 WEEKS PREGNANT    You are now in your last trimester of pregnancy. Your baby is growing rapidly, can open and close her eyelids, sometimes get hiccups, and you'll probably feel her moving around more often. Your baby has breathing movements and is gaining about one ounce each day. You may notice heartburn and leg cramps. Your back may ache as your body gets used to your baby's size and length.    Discuss your work situation with your midwife or physician as needed. If you stand for long periods of time, you may need to make changes and take breaks.    Pre-register online at the hospital where your baby will be born ([email or web address])     Be aware of your baby's activity level. You may be asked to do daily fetal movement counts. Contact your midwife or physician about any decreased movement.    You may have been tested for gestational diabetes today. If you are RH negative, you may have had an additional test and a Rhogam injection.    Consider receiving a Tdap vaccination to protect your baby from Pertussis/whooping cough.    If you are considering tubal ligation, discuss this with your midwife or physician. You will need to have an appointment with the obstetrician who will do the surgery to discuss the risks, benefits, and alternatives, and to sign the consent. This can be discussed at your next visit.    Continue to watch for any signs or symptoms of premature labor:     Regular contractions. This means having about 6 or more within 1 hour, even after you have had a glass of water and are resting.     A backache that starts and stops regularly.    An increase or change in vaginal discharge, such as heavy, mucus-like, watery, or bloody discharge.     Your water breaks or leaks.    Continue to watch for signs and symptoms of preeclampsia:     Sudden swelling of your face, hands, or feet     New vision  problems such as blurring, double vision, or flashing lights    A severe headache not relieved with acetaminophen (Tylenol)    Sharp or stabbing pain in your right or middle upper abdomen    If you have any of the above symptoms or any other concerns, call your midwife or physician or their clinic staff at [facility] at Phone: [phone]. If it's after clinic hours, physician patients should call the Care Connection at [phone] ([phone]); midwife patients should call their answering service at [phone].    How can you care for yourself at home?   You can refer to the Starting Out Right book or find it online at [email or web address].pdf or http://www.[email or web address]/images/stories/flipbooks/[facility]-starting-out-right/[facility].html#p=8     You can sign up for a weekly parenting e-mail that gives support, tips and advice from health care professionals that starts with pregnancy and continues through the toddler years. To register, go to [email or web address] at any time during your pregnancy.      Baby Feeding in the Hospital: Information, Support and Resources    As you prepare for the birth of your child, you will want to consider options for feeding your baby including breast-feeding and/or baby formula. The American Academy of Pediatrics recommends exclusive breast-feeding for the first six months (although any amount of breast-feeding is beneficial).  However, we also understand that breast-feeding is a personal choice and not for everyone. Whether or not you choose to breast-feed, your decision will be respected by our staff.    There are numerous benefits of breast-feeding; here are a few to consider:    Provides antibodies to protect your baby from infections and diseases    The cost: formula can cost over $1,500 per year    Convenience, no warming up or sterilizing bottles and supplies    The physical contact  with breastfeeding can make babies feel secure, warm and comforted    What ever my feeding choice, what can I expect after I deliver my baby?    Your baby will usually be placed skin-to-skin immediately following birth. The skin to skin contact between you and your baby will be a special and memorable time. The bonding and attachment comforts your baby and has a positive effect on baby s brain development.     Having your baby  room in  with you also helps you start to learn your baby s body rhythms and sleep cycle.      You will also begin to learn your baby s cues (signals) that he or she is ready to feed.    When do I start to feed my baby?  As soon as possible after your baby s birth, you will be encouraged to begin feeding.  In the first couple of weeks, your baby will eat often.  Breastfeeding babies usually eat at least 8 times in 24 hours.  Babies fed formula usually eat at least 7 times in 24 hours.      Breast-feeding tips:    Get comfortable and use pillows for support.    Have your baby at the level of your breast, facing you,  tummy to tummy .      Touch your nipple to your baby s lips so you baby s mouth opens wide (rooting reflex).  Aim the nipple toward the roof of your baby s mouth. When your baby opens his or her mouth, pull your baby toward your breast to help your baby  latch on  to your nipple and much of the areola area.    Hand expressing your breast milk can assist with latching your baby to your breast, if needed.    Ask for help, breastfeeding may seem awkward or uncomfortable at first, this is normal. There are numerous resources available at [facility] and beyond.     If your goal is to exclusively breastfeed, avoid using any formula or artificial nipples (including bottles and pacifiers) while you are your baby are learning to breastfeed unless there is a medical reason.       Mixing breastfeeding and formula can interfere with how you begin building your milk supply.   It can impact how you and your baby  learn  to breastfeeding together and alter the natural growth of  good  bacteria in your baby s stomach.    Delay a pacifier or a bottle in the first few weeks until breastfeeding is well established. This is often around 3 weeks of age.    Ask your nurse to show you how to hand express.   Breast milk can be kept in the refrigerator or freezer for later use.  (over)  Hospital Resources:  [facility] located at [facility], [facility] and [facility]  Call: [phone].    Inpatient support    Outpatient appointments    Telephone consultation    Breast-feeding classes available through [employer]      Online Resources:    [email or web address] sign up for free online weekly e-mail    [email or web address]    Breastfeedingmadesimple.com    Llli.org (La Leche League)    Normalfed.com    Womenshealth.gov/breastfeeding    Workandpump.com    Breast-feeding Supplies & Pumps:  Talk to your insurance provider or WIC (Women, Infants and Children) to learn more about options available to you. Recent health insurance changes may include additional coverage for supplies and pumps.    Public Health:  Women, Infants and Children Nutrition program (WIC): provides breast-feeding support and education in addition to formal feeding moms. 916-CUF-4520 or [email or web address]://[email or web address]/fh/[email or web address]    Family Health Home Visiting: Public Health Nurse home visits are available. Talk to your provider to see if you qualify. Most [facility] have a program available.    Additional Resources:  La Leche League is an international, nonprofit, nonsectarian organization offering information, education, and support to mothers who want to breast-feed their babies. Local groups offer phone help and monthly meetings. Visit [employer]Works or WearPointli.org and us the  Find local support  drop down menu or click on the  Resources  tab.    [place] Breastfeeding  Resources: 6-635-412-BABY (4) toll free    National Breastfeeding Help Line trained breastfeeding peer counselors can help answer common breast-feeding questions by phone. Monday-Friday: English/Jordanian  1-767- 977-5051 toll free, 1-834.244.7347 (TTY)    [facility] Connection: [phone] ([phone])     Patient Education   HEALTHY PREGNANCY CARE: 30-34 WEEKS PREGNANT    You have made it to the final months of your pregnancy. By now, your baby is starting to fill out with some fat under his skin, fuzzy hair on his shoulders, and is gaining 4 to 6 ounces per week.    Discuss any travel plans with your midwife or physician.    Review possible changes in sexuality during later pregnancy and discuss these with your midwife or physician, as well as your partner. Alternative love-making positions may be more comfortable.    Discuss labor and delivery issues with your midwife or physician. If you had a  birth in the past, discuss a trial of labor with your midwife or physician. He or she may ask that you sign a consent form, if you wish to have a vaginal birth after  (). Ask your midwife or physician to explain your options for managing pain during your labor and delivery. Sometimes, during the birth process, an episiotomy may need to be cut in the vagina to make the opening bigger or let the baby come out quicker. You may want to discuss the episiotomy and how often it is needed with your midwife or physician.    Plan for your baby's care by selecting a child health care provider (Family physician, Pediatrician, or Pediatric Nurse Practitioner). Practice installing an infant car seat correctly in the car. Ask for car seat information as needed and make sure it is safe and will work in the car your baby will ride in. You will need a car seat to bring your baby home from the hospital. Check the procedure for adding your baby to your health care plan. Review your decision about circumcision and ask  for any information you need. As you buy and receive items for your baby, don't put a baby walker on your list. Walkers can be dangerous and can cause serious injury to your child. A safer option is a saucer-type play station, since it doesn't allow baby to travel across the floor.    Discuss your choices and plans for birth control with your midwife or physician. Women who are breastfeeding can still become pregnant. Use a birth control method if you want to lower your pregnancy risk. Talk to your midwife or physician if you are considering permanent birth control, such as tubal ligation or Essure. You may need to complete a consent form 30 days prior to delivery in order to have this done after you deliver.    Continue to watch for signs and symptoms of preeclampsia:     Sudden swelling of your face, hands, or feet     New vision problems such as blurring, double vision, or flashing lights    A severe headache not relieved with acetaminophen (Tylenol)    Sharp or stabbing pain in your right or middle upper abdomen    Watch for signs and symptoms of premature labor:     Regular contractions. This means having about 6 or more within 1 hour, even after you have had a glass of water and are resting.     A backache that starts and stops regularly.    An increase or change in vaginal discharge, such as heavy, mucus-like, watery, or bloody discharge.     Your water breaks or leaks.    If you have any of the above symptoms or any other concerns, call your provider or their clinic staff at [facility] at Phone: [phone]. If it's after clinic hours, physician patients should call the Care Connection at [phone] ([phone]); midwife patients should call their answering service at [phone].    How can you care for yourself at home?   You can refer to the Starting Out Right book or find it online at [email or web address]://www.[facility].org/images/stories/maternity/[facility]-Starting-Out-Right.pdf or  http://www.[facility].org/images/stories/flipbooks/[facility]-starting-out-right/[facility]-starting-out-right.html#p=8     You can sign up for a weekly parenting e-mail that gives support, tips and advice from health care professionals that starts with pregnancy and continues through the toddler years. To register, go to [email or web address] at any time during your pregnancy.

## 2021-05-29 NOTE — PROGRESS NOTES
Barbara Aguirre Her is here by herself for a routine prenatal visit at 31w5d.  She has no concerns and is feeling well.  Has noticed a few Lar[facility] ctx.  She is feeling fetal movement regularly. Fetal maturity and expectations for fetal movement in the third trimester, how and when to do fetal kick counts and when to call CNM discussed. Appetite is normal. Interval weight gain is appropriate.  Reviewed 28 week lab results - passed 3 hour GTT.  TdaP given at last visit.   Reviewed resources for CBE - plans NCB again.   Reviewed Pregnancy checklist.  Plans circ - advised checking coverage for in hospital vs. Clinic.    Insurance did not cover breastpump last time.  Advised to check this time - may be different.  Desires prenatal lactation visit or at least discussion.  Poor latch and jaundice with first baby.  Formula fed at night.    Anticipatory guidance, warning signs (with emphasis on  labor signs and symptoms), when to call and CNM contact information reviewed.

## 2021-05-29 NOTE — PATIENT INSTRUCTIONS - HE
[facility] Nurse Midwives - Contact information:  Appointment line and to get a hold of CNM in clinic Monday-Friday 8 am - 5 pm:  [phone].  There are some clinics with early start times (1st appointment 7:40 am) and others with evening hours (last appointment 6:20 pm).  Most are typically open from 8 am to 5 pm.    CNM on call answering service: [phone].  Specify your hospital of choice and leave a brief message for CNM;  will then page CNM who is on call at your specified hospital and you should receive a call back with 15 minutes.  Be sure that your ringer is audible and that you can accept blocked calls so that we can get back in touch with you! This number should be reserved for urgent needs if during the day, before 8 am, after 5 pm, weekends, holidays.    Contact the on-call CNM with warning signs, such as:    vaginal bleeding     Vaginal discharge and itching or pain and burning during urination    Leg/calf pain or swelling on one side    severe abdominal pain    nausea and vomiting more than 4-5 times a day, or if you are unable to keep anything down    fever more than 100.4 degrees F.        You are invited to  Meet the [facility] Nurse-Midwives  A way to tour the hospital Labor and Delivery unit and meet the midwives that attend births since you may not have the opportunity to meet them during your prenatal care.  Some sessions are informal meet and greet type social hours, others address a specific concern or topic.    Tuesday, Februrary 12, 2019 7-8pm  [facility]    Wednesday, April 17, 2019 7-8pm  [facility]    Thursday, August 15, 2019 7-8pm  [facility]    Wednesday November 13, 2019 7-8 pm  [facility], [facility]    Please call [phone] to register          Touring the [facility]  To schedule a tour at either [facility] or [facility], please do so online using  "the following links:  [facility] - [email or web address]  [facility] - [email or web address]      Pre-registration for Hospital Stay:  Sometime betweeen 30-37 weeks, it is recommended that you \"pre-register\" for your upcoming hospital stay on our website:    [email or web address]    Breastfeeding and Birth Control  How do I decide what birth control method is best for me while I am breastfeeding?  Choosing a method of birth control is very personal. First, answer the following questions:      Do you want to have more children?    How much spacing between births do you want for your children?    Do you smoke or have you had any health problems, such as liver disease or a blood clot?  Talk about the answers to each of these questions with your health care provider to help you choose the best method for you.    Can I use breastfeeding as my birth control?  Using breastfeeding as your birth control (the lactational amenorrhea method) can be a good way to keep from getting pregnant in the first months after the baby is born. Each time your baby nurses, your body releases a hormone called prolactin, which stops your body from making the hormones that cause you to ovulate (release an egg). If you are not ovulating, you cannot get pregnant.    The lactational amenorrhea method works only if:    you have not started your period yet.    you are breastfeeding only and not giving your baby any other food or drink.    you are breastfeeding at least every 4 hours during the day and every 6 hours at night.    your baby is less than 6 months old.  When any 1 of these 4 things is not happening, you no longer have good protection from getting pregnant, and you should use another form of birth control.    What birth control " methods are safe for me to use while I breastfeed?    Methods without hormones  Methods without hormones do not affect you, your baby, or your breastfeeding.  Methods without hormones that are the most effective    The copper intrauterine contraceptive device (IUD) (ParaGard) is a small, T-shaped device that is in- serted into your uterus (womb) through the vagina and cervix. The copper IUD lasts for 10 years.    Sterilization (getting your tubes tied or your partner having a vasectomy) is very effective, but it is per- manent. You should choose sterilization only if you do not want to have more children.  A method without hormones that is effective    The lactational amenorrhea method described above is effective for the first 6 months.  Methods without hormones that are less effective    Natural family planning is monitoring your body for signs of ovulation and not having sex when you think you are ovulating. This method is reliable only if you are having regular periods every month.    Barrier methods (condoms, diaphragms, sponges, and spermicides) are used at the time you have sex. These methods are effective only if you use them correctly every time.    Methods with hormones  Birth control methods that use hormones can be used while you are breastfeeding. They may have a small effect on lowering the amount of milk you make. All hormones will get into your breast milk in very small amounts, but there is no known harm to your baby from this small amount of hormone in breast milk.only methodsmethods use only 1 hormone, called progestin. You can start them right after your baby is born or wait 4 to 6 weeks to make sure your milk supply is good.     Progestin-only pills ( minipills ): If you like to take pills every day, you can use the minipill. In order forpill to work well, you have to take 1 at the same time each day. When you stop breastfeeding, you should start pills that have both estrogen and progestin  because they are better at keeping you from get- ting pregnant.     Progestin IUD (Mirena): The progestin IUD is shaped and inserted into the uterus like the copper IUD. It works for up to 5 years. Both IUDs are usually inserted 4 to 6 weeks after the baby is born.    Progestin implant (Nexplanon): The progestin implant is a small matchstick-sized flexible neli. It is placed into the fatty tissue in the back of your arm. It works for up to 3 years.    Progestin shot (Depo-Provera): The progestin shot is given every 3 months.    estrogen and progestin methods  These methods use 2 hormones, called estrogen and progestin.     These methods increase your risk of a blood clot, which is already higher than normal after you have a baby. You should not use them until your baby is at least 6 weeks old. The combined methods are not recommended as the first choice for women who are breastfeeding. If a combined method is the one that you feel will be best for you to prevent getting pregnant, these methods are okay to use while breastfeeding.     Combined birth control pills: You take a pill each day.    Vaginal ring (NuvaRing): The ring is worn in the vagina for 3 weeks then left out for 1 week before youin a new ring.    Patch (Ortho Evra): The patch is placed on your skin and changed every week for 3 weeks then left off forweek before putting a new patch on a different area of your skin.    Pediatric Care Providers at [facility]:    Choosing the right provider is one of the most important decisions you ll make about your health care. We can help you find the right one. Remember, you re looking for a provider you can trust and work with to improve your health and well-being, so take time to think about what you need. Depending on how complicated your health care needs are, you may need to see more than one type of provider.    Primary Care Providers: You ll see a primary care provider first for most health issues. They ll work  with you to get your recommended screenings, help you manage chronic conditions, and refer you to other types of providers if you need them. Your primary care provider may be called a family physician or doctor, internist, general practitioner, nurse practitioner, or physician s assistant. Your child or teenager s provider may be called a pediatrician.  Specialists: You ll see a specialist for certain services or to treat specific conditions. Specialists include cardiologists, oncologists, psychologists, allergists, podiatrists, and orthopedists. You may need a referral from your primary care provider before you go to a specialist in order for your health plan to pay for your visit.\pardHere are some tips for finding a provider where you live:  If you already have a provider you like and want to keep working with, call their office and ask if they accept your coverage.  Call your insurance company or state Medicaid and CHIP program. Look at their website or check your member handbook to find providers in your network who take your health coverage.  Ask your friends or family if they have providers they like and use these tools to compare health care providers in your area.    Family Medicine at [facility]: [email or web address]    Many of our families enjoy all seeing the same doctor, who comes to know the whole family very well. We base our practice on the knowledge of the patient in the context of family and community.  WHY CHOOSE A FAMILY MEDICINE PHYSICIAN?  Ability of the whole family to see the same doctor  Focus on the whole person, including physical and emotional health  A personal relationship with their doctor that is nurtured over time  Respect for individual and family beliefs and values  No need to change primary care providers when a certain age is reached  Coordination of care when other health care services are needed    Pediatrics at [facility]:    [email or web address]    Through a teaching affiliation with the [facility], [facility] staff keeps current on new developments in the field of pediatrics.     Everything we do centers around caring for children. We place special emphasis on wellness and prevention.pediatric care team includes a team of pediatricians and certified nurse practitioners who provide care to pediatric and adolescent patients ages 0 to 18, and some up to the age of 26. We offer preventive health maintenance for healthy children as well the diagnosis and treatment of common and chronic illnesses and injuries. In addition, we also offer several pediatric specialists who focus on adolescent health issues and developmental and behavioral issues.    Circumcision  What is circumcision?  At birth, baby boys have loose skin that covers the head of the penis. This skin is called the foreskin. When all or part of the foreskin of the penis is cut off, this is called circumcision.  Why is circumcision done?  Circumcision is done for many reasons including Mu-ism, cultural, looks, and health. Some Mu-ism groups circumcise all boys as a sonia-based practice. Many people in the [place] choose to circumcise their baby boys because they believe it is culturally normal. It is not a common practice in [place], [place], or [place].  Some parents choose circumcision so that their son will have a penis that looks like his father s if the father was also circumcised. Other people choose circumcision because they believe it is  or will protect the boy or man from infection or cancer later in life.  Does circumcision protect against infection or cancer?  Circumcision does seem to protect against some types of infection or cancer. Cancer of the penis is one type of cancer that circumcision may prevent. However, cancer of the penis is very rare. One hundred thousand circumcisions would need to  be done to prevent one case of cancer of the penis. Circumcision may also decrease the chance of some sexually transmitted infections, such as HIV and human papilloma virus (HPV). See the next page for more information on the risks and benefits of circumcision.  What happens during a circumcision?  Babies born in the hospital are usually circumcised before they go home. Health care providers also perform circumcisions in their offices and clinics within a few weeks after birth.  Pentecostal circumcisions are most often done at home or in a Christianity.  Before the circumcision is performed, some providers give an injection (shot) of a small amount of anesthetic (numbing medicine) at the base of the penis to block the pain or put an anesthetic cream on the penis to numb the area that will be cut.  There are 2 different ways to do a circumcision. In one type, a clamp placed around the head of the penis cuts off the blood supply to the foreskin, and the foreskin above the clamp is cut off. The clamp is left on the penis until the area heals and it falls off a few days later. In another type of circumcision, the foreskin is cut off with scissors or a scalpel.  After the circumcision, petroleum jelly and sometimes gauze may be put over the area of the penis where the skin was removed. This protects the end of the penis while it heals.  Can I keep my son s penis  if it is circumcised?  Regular washing with soap and water will keep any penis clean. Circumcision does not make the penis . Uncircumcised boys do need to be taught to clean beneath their foreskin, just like they need to be taught to wash their hands or brush their teeth.  How do I decide if I should have my son circumcised?  The American Academy of Pediatrics (AAP) says that  circumcision may have health benefits. They do not recommend circumcision for all boys as a routine procedure. The AAP recommends that you talk to your health care provider  to decide if circumcision is the right choice for your family. You may also wish to discuss the question with your family or .  What are the risks and benefits of circumcision?  We do not have a lot of good scientific information about the health risks or benefits of circumcision.  Possible Risks:  Very few baby boys (less than 1 in 100) will have a problem after circumcision, such as bleeding or mild infection of the penis. These problems are usually not serious and are easy to treat.  Less common problems are:    Removal of too much or too little foreskin  Some rare problems are:    Narrowing of the opening of the penis, which can cause problems with urination    Removal of part of the penis or death of some of the other skin on the penis   Infection that spreads to other parts of the body  People used to think babies did not really feel pain. Now we know that they do. Many baby boys appear to feel a lot of pain during circumcision if anesthesia is not used.  We do not know if circumcision affects sexual function or sensation.  Possible Benefits:    Less risk for some kinds of cancers, like cancer of the penis    Fewer urinary tract (bladder or kidney) infections for babies    Less risk for some sexually transmitted infections, such as HIV, herpes, and HPV     May protect female sexual partners from some sexually transmitted infections    For More Information  American Academy of Family Physicians: Circumcision  http://familydoctor.org/familydoctor/en/pregnancy-newborns/caring-for-newborns/infant- care/circumcision.html  MedlinePlus: Circumcision (includes a slide show on the procedure)  www.nlm.nih.gov/medlineplus/circumcision.html  American Academy of Pediatrics: Policy statement on circumcision  Http://pediatrics.aappublications.org/content/130/3/e756.abstract      Childbirth and Parenting Education:   [facility]: http://Soph[employer] Learning/   [phone]  [place]: (education,  yoga & wellness) www.Nitronex  Enlightened Mama: www.enlightenedmama.com   Childbirth collective: (Parent topic nights)  www.childbirthcollective.org/  Hypnobabies:  www.hypnobabiestwincities.com/  Hypnobirthing:  Http://hypnobirthing.com/    Book Recommendations:   Dedra [place]'s Birthing From Within--first few chapters include a new-age tone, you may prefer to skip it and keep going, because there is good stuff later.  This book recommendation covers emotional preparation, but does cover coping with pain, and use of both pharmacological and nonpharmacological methods.    Dr. Martínez' The Pregnancy Book and The Birth Book--the pregnancy book goes month-by month    Womanly Art of Breastfeeding by La Leche League International   Bestfeeding by Annette Zimmerman--great pictures    Mothering Your Nursing Toddler, by Jenn Hodges.   Addresses dealing with so many of the challenging behaviors of a nursing toddler.  How Weaning Happens, by La Leche League.  Discusses weaning at all ages, from medically necessary weaning of an infant, all the way up to age 5 (or older), with why/why not, and strategies.  Very empowering book both for deciding to wean and deciding not to.    American College of Nurse-Midwives (ACNM) http://www.midwife.org/; look at the informational handouts at http://www.midwife.org/Share-With-Women     www.mymidwife.org    Mother to Baby (Medication and Herbal guidance in pregnancy): http://www.mothertobaby.org  Toll-Free Hotline: 188.712.1997  LactMed (Medication use while breastfeeding): http://toxnet.nlm.nih.gov/newtoxnet/lactmed.htm    Women's Health.gov:  http://www.womenshealth.gov/a-z-topics/index.html    American pregnancy association - http://americanpregnancy.org    Centering Pregnancy (group prenatal care option): http://centeringhealthcare.org    Information about doulas:  Childbirth collective: http://www.childbirthcollective.org/  [employer] of [place] (CONSUELO):  www.consuelo.org  [facility]  " project: http://Diavibetiesdoulaproject.com/     Early Childhood and Family Education (ECFE):  ECFE offers parents hands-on learning experiences that will nourish a lifetime of teachable moments.  http://ecfe.info/ecfe-home/    March of Dimes www.Availigent     FDA - Nutrition  www.mypyramid.gov  Under \"For Consumers,\" click on \"pregnant and breastfeeding women.\"      Centers for Disease Control and Prevention (CDC) - Vaccines : http://www.cdc.gov/vaccines/       When researching information on the web, question the validity of websites.  The [employer] .gov, .edu and.org tend to be more reliable information.  If there are a lot of advertisements, be cautious of the information provided. Stay away from blogs and chat rooms please!   "

## 2021-05-30 NOTE — PROGRESS NOTES
Barbara is doing well.    Active baby.    Her  set up the nursery and rearranged after cervical exam last week!  Discussed s/sx of labor and differences between first and second baby.  Discussed number to call and when to call.  Plans to accept all baby meds:  Erythromycin, Vit K, and Hep B  Needs to check with insurance on coverage for circ - in hospital vs. Out of hospital.   Also reviewed fetal movement awareness.

## 2021-05-30 NOTE — PATIENT INSTRUCTIONS - HE
[facility] Nurse Midwives - Contact information:  Appointment line and to get a hold of CNM in clinic Monday-Friday 8 am - 5 pm:  [phone].  There are some clinics with early start times (1st appointment 7:40 am) and others with evening hours (last appointment 6:20 pm).  Most are typically open from 8 am to 5 pm.    CNM on call answering service: [phone].  Specify your hospital of choice and leave a brief message for CNM;  will then page CNM who is on call at your specified hospital and you should receive a call back with 15 minutes.  Be sure that your ringer is audible and that you can accept blocked calls so that we can get back in touch with you! This number should be reserved for urgent needs if during the day, before 8 am, after 5 pm, weekends, holidays.    Contact the on-call CNM with warning signs, such as:    vaginal bleeding     Vaginal discharge and itching or pain and burning during urination    Leg/calf pain or swelling on one side    severe abdominal pain    nausea and vomiting more than 4-5 times a day, or if you are unable to keep anything down    fever more than 100.4 degrees F.          You are invited to  Meet the [facility] Nurse-Midwives  A way to tour the hospital Labor and Delivery unit and meet the midwives that attend births since you may not have the opportunity to meet them during your prenatal care.  Some sessions are informal meet and greet type social hours, others address a specific concern or topic.    Tuesday, Februrary 12, 2019 7-8pm  [facility]    Wednesday, April 17, 2019 7-8pm  [facility], [facility]    Thursday, August 15, 2019 7-8pm  [facility]    Wednesday November 13, 2019 7-8 pm  [facility], [facility]    Please call [phone] to register        Touring the Maternity Care Center  To schedule a tour at either [place] or [facility], please do so online using  the following links:  [facility] - [email or web address]  [facility] - [email or web address]      Car Seat Clinics:  https://dps.[place].gov/divisions/ots/child-passenger-safety/Pages/car-seat-checks.aspx  [facility]    Free Car Seat Distribution Facilities     By Appt. Address Contact Information (For appointment)      \Yes [employer]\[address]\[place],\[place])-\[email or web address]      Yes [facility]\ [facility]\[place],\cell Josey Jacques)[phone]\[email or web address]      Yes [facility]\[address] [facility]\[place],\cell Mandi Leblanc)[phone]\[email or web address]      Immunizations:  http://www.cdc.gov/vaccines/schedules/easy-to-read/child.html      Postpartum Depression  The first weeks of caring for a  baby are more than a full-time job. Although it is often a happy time, your feelings and moods may not be what you expected. Many women experience  baby blues.  Here are some tips to help you understand when feelings of sadness are normal, and when you should call your health care provider.    What are the baby blues?  As many as 3 of every 4 women will have short periods of mood swings, crying, or feeling cranky or restless during the first weeks after birth. These feelings can be worse when you are tired or anxious. Women who have the baby blues often say they feel like crying but don t know why. Baby blues usually happen in the first or second week postpartum (after you give birth) and last less than a week. If you are not sleeping, becoming more upset, don t feel like you can take care of your baby, or your sadness lasts 2 weeks or more, call your health care provider.    What is postpartum  depression?  About one in every 5 women will develop depression during the first few months postpartum that may be mild, moderate, or severe. Women who have postpartum depression may have some of these symptoms:    Feeling guilty     Not able to enjoy your baby and feeling like you are not bonding with your baby      Not able to sleep, even when the baby is sleeping    Sleeping too much and feeling too tired to get out of bed    Feeling overwhelmed and not able to do what you need to during the day    Not able to concentrate    Don t feel like eating    Feeling like you are not normal or not yourself anymore    Not able to make decisions    Feeling like a failure as a mother    Feeling lonely or all alone    Thinking your baby might be better off without you  If you have any of these symptoms, call your health care provider!    Which symptoms of postpartum depression are dangerous?  Sometimes a woman with postpartum depression will have thoughts of harming herself or her baby. If you find yourself thinking about hurting yourself or your baby, call your health care provider immediately.    MOTHERHOOD: THE EARLY DAYS  You prepare for the birth of your baby for many months during pregnancy, and then the first months at home after your baby is born can be a quiet, gentle time of getting to know this new person who has come to live in your home. But for most women it is not all quiet or sweet. And for some women it is a very hard time.  What Can I Expect in the First Few Months After the Baby Comes?  New mothers and their families face many challenges in the first few months:    Your body and your hormones have to get back to normal.    You and the baby will be learning to breastfeed.    Babies only sleep a few hours at a time. The entire family will have a hard time getting enough sleep.    You and your family need to learn how to parent this new family member.    If you have a partner, you have to figure out how to  stay together as a couple and maybe even start to have sex again.    You may have to figure out how to keep from getting pregnant again right away.    You may need to return to work and find day care.    How Long Will it Take for My Body to Get Back to Normal?  Some changes will occur quickly. Others will not occur as quickly.    Your uterus, cervix, and vagina will all shrink to their nonpregnant size in about 2 weeks. Your vagina may be tender and dry for a few months--especially if you are breastfeeding.    If you had stitches or hemorrhoids, your   bottom   will be sore for 2 weeks or more.    For some women who have problems urinating, it can take several months for you to be able to hold your urine when you cough or sneeze or suddenly  something heavy.    Your breast milk will   come in   2 to 3 days after the birth of your baby. It will take 6 to 8 weeks for you and the baby to get the hang of breastfeeding and find a pattern. During these first weeks, you can have engorged breasts at times and often leak milk.    Your stomach and intestines all have to fall back into place. You may have a lot of gas for a few weeks.    You may be constipated--especially if you are breastfeeding.    Your stretched stomach muscles can recover in a few weeks, but for some women it takes longer--6 months or a year--to recover.    If you had a  delivery, you may have pain or numbness around the incision for 6 months or more.    Losing the weight you gained during pregnancy will probably take 6 months to a year. Have patience! It took 40 weeks to get here. Give yourself 40 weeks to get back.    What Can I Expect When My Hormones Change?  About 75% of all women will get the   blues.   This usually starts about 3 days after the birth of your baby. You may cry easily and feel very, very tired. A few women become very depressed. If you had a  delivery or your new baby was sick, you are at a higher risk for  depression.  Call your health care provider right away if you cannot care for yourself or your baby, if you feel very nervous or worried, if you cannot stop crying, or if you are having thoughts of hurting yourself or your baby.    Taking Care of Yourself  While you are still pregnant:    Talk with your partner and your family about the time ahead. Arrange for someone to help you during the first weeks at home if you can.    Talk with your health care provider about birth control options and make a plan before the baby comes.    If you are worried about how to parent a , take parenting classes. You will learn a lot about how babies act and you will make some friends who are going through the same thing at the same time. Most [facility] have these classes.    Arrange for someone to help with baby care if you can.  After the baby comes:    Ask for help. Let other people do the cooking and cleaning and run the house. Focus on yourself and your baby.    Sleep whenever you can. Try not to be tempted to   get some things done   when the baby sleeps. This is your time to sleep, too.    Drink lots of water. You will need at least 6 big glasses of water everyday to avoid constipation and make enough breast milk. Every time you sit down to breastfeed, have a big glass of water with you to drink while you are nursing.    Eat lots of vegetables and fruit. You will need lots of vitamins and fiber to help your body get back to normal. This will also help you avoid constipation.    Go outside and walk. Babies can go outside even if it is very cold. Fresh air and sunshine will do you both good.    Take sitz baths. Put about 6 inches of warm water in your bathtub and sit in there for 15 minutes 2 to 3 times a day. This will help your   bottom   heal more quickly. It will also give you 15 minutes of private time!    Talk to other mothers. Join a new parents group. Call Jennie and go to [facility] meetings if you are  breastfeeding.     With your partner:    Keep talking. Share the experience.    Spend time alone. Even a 30-minute walk can be a date.    Start a birth control method. You can get pregnant before you even have a period. It is very important to use birth control if you do not want to get pregnant again right away.    When you have sex, use a lubricant. A lot of lubricant! Take it slow.  The first few months after a baby comes can be a lot like floating in a jar of honey--very sweet and bell, but very sticky, too. Take time to enjoy the good parts. Remind yourself that this time will pass. Bon voyage!    FOR MORE INFORMATION  For questions about depression during and after pregnancy:  http://www.womenshealth.gov/publications/our-publications/fact-sheet/depression-pregnancy.html   After birth: The first 6 weeks:  http://www.[employer]/Post-Birth-and-Recovery   Breastfeeding resources:  http://www.[employer].org/health-info/getting-breastfeeding-off-to-a-good-start/    HEALTHY PREGNANCY CARE: 37 to 41 WEEKS PREGNANT    Talk with your midwife or physician about when to call with signs of labor    Regular uterine contractions that are getting closer together and/or stronger    If you think your water has broken or is leaking    Bleeding from the vagina like a period (bloody vaginal discharge is normal)    If you are not feeling your baby move    Make plans for transportation and  as needed for when you are going to the hospital.    Your midwife or physician may offer to check your cervix for changes.     Ask your health care provider about vaccinations you may need following delivery. By now, you should have received a Tdap immunization to protect against pertussis or whooping cough. Fathers and family members who will be in close contact with the baby should also receive a Tdap shot at least two weeks before the expected birth of the baby if they have not had a Td (tetanus) shot for at least  two years.    If you are past your due date, discuss the next steps leading to delivery with your midwife or physician. If you don't start labor on your own by 41 or 42 weeks, your midwife or physician may recommend giving you medicines to ripen your cervix and start labor.    Preparing for your baby: Tell your midwife or physician how you plan to feed your baby (breast or bottle), who you have chosen to do pediatric care for your baby, and if you have a boy, whether you have chosen to have him circumcised. You will need a car seat correctly installed in your vehicle to bring your baby home. As you start to set up the nursery at home for your baby, make sure the crib is safe. The mattress needs to fit snugly against the edges of the crib. If you can fit a soda can between the bars, they are too far apart and can allow the baby's head to caught between them.    Learn about infant care and feeding, including information about infant CPR. We recommend that you put your baby to sleep on his or her back to reduce the chance of Sudden Infant Death Syndrome (SIDS). To maintain a healthy environment in which your child can grow, it's best to keep your home smoke-free. By preparing ahead, your transition into parenthood will go smoothly for you and your baby.    Your midwife or physician will want to see you for a checkup 2 to 6 weeks after delivery.    If you have questions about any symptoms you are experiencing or any other concerns, call your provider or their clinic staff at [facility] at Phone: [phone]. If it's after clinic hours, physician patients should call the [facility] at [phone] ([phone]); midwife patients should call their answering service at [phone].    How can you care for yourself at home?   You can refer to the Starting Out Right book or find it online at http://www.[facility].org/images/stories/maternity/[facility]-Starting-Out-Right.pdf or  http://www.[facility].org/images/stories/flipbooks/[facility]-starting-out-right/[facility]-starting-out-right.html#p=8     You can sign up for a weekly parenting e-mail that gives support, tips and advice from health care professionals that starts with pregnancy and continues through the toddler years. To register, go to [email or web address]/baby at any time during your pregnancy.        Making Plans for Feeding My Baby    By this point, you probably have read a lot about feeding your baby.  Breastfeed or formula? Each mother s decision is her own and [facility] respects you and your choices. We ve gathered information on both breastfeeding and formula feeding to help with your decision. Talking with your physician or nurse-midwife can also help in your decision.  However you plan to feed your baby, [facility] encourage rooming in with your baby, skin-to-skin contact and feeding your baby based on his or her cues.    Skin-to-skin contact  Being close to mom helps your baby adjust to life outside of the womb.  It helps your baby regulate their body temperature, heart rate, and breathing.  Your baby will usually be placed skin-to-skin immediately following birth or as soon as possible, if medical intervention is needed.    Rooming-In  Having your baby stay with you in your room is called  rooming-in .  Keeping your baby in your room helps you to learn how to care for your baby by getting to know your baby s cues, body rhythms and sleep cycle.       Cue-based feeding  Cues (signals) are baby s way of telling you what he or she wants.  When you learn your infant s cues, you know how to care for and feed your baby.   Feeding cues are the licking and smacking of lips, bringing their fist to their mouth, and a reflex called  rooting - where baby turns and opens his or her mouth, searching for the breast or bottle.  Crying is a late feeding cue.  Babies can feed frequently, often at least 8 times in 24  hours.  Breastfeeding facts  Breast milk is the best source of nutrition for your baby and is available at birth. In the first couple of days, your milk volume is already starting to increase, though it may not be noticeable. Breastfeed frequently to increase your milk supply. Within three to five days, you will begin to notice larger milk volumes. An increase in breast size, heaviness and firmness are often described as the milk  coming in.  Frequent breastfeeding can help breasts from getting overly firm and painful. You will know the baby is getting enough milk if your baby is having wet and dirty diapers and gaining weight.     If your goal is to exclusively breastfeed, it is important to not use any formula or artificial nipples (including bottles and pacifiers) while your baby is learning to breastfeed.  While it may seem like an  easy  option to give your baby a bottle, formula should only be given if there is a medical reason for your baby to have it.    Positioning and attachment   Get comfortable.  Use pillows as needed to support your arms and baby.  Hold baby close at the level of your breast, facing you in a tummy to tummy position.  Skin to skin helps with this.  Position the baby with his or her nose by the nipple.  There should be a straight line from baby s ear to shoulder to hips.  Tickle your baby s lips or wait for baby to open mouth wide, bring baby to breast by leading with the chin.  Aim the nipple at the roof of baby s mouth.  A rapid sucking pattern is followed by longer, drawing pattern with occasional swallows heard.  When baby is correctly latched, your nipple and much of the areola are pulled well into baby s mouth.      Returning to work or school  Focus on a good start to breastfeeding.  Many women continue to provide breastmilk for their baby when they return to work or school.  Making plans about where to pump and store milk can make the transition go well.  Talk with other mothers  who have also returned to work or school for tips and support.  Your employer s Human Resource department may be a resource as well.     Returning to work or school: (continued)     babies can mean fewer  sick  days for you.    A quality breast pump will also save time and add comfort.  Check with your insurance prior to giving birth for breast pump coverage.  Many insurance companies include a pump within your benefits.    Wait until your baby is at least three weeks old to introduce a bottle for the first time.  Have someone besides you give the bottle.    Breastfeed when you are with your baby. Reserve your bottles of breast milk for when you are away.     Your breasts will need to be  emptied  either by your baby or a pump.  Plan to pump at least twice in an eight hour day.    If you cannot pump at work, continue breastfeeding at home. Any amount of breast milk is worth giving to your baby.    Formula feeding facts  If you are planning to use formula to feed your baby, you will want to make some preparations ahead of time. Talk to your doctor or nurse-midwife about what type of formula to use. Some are iron-fortified, meaning they have extra iron in them. You will want to purchase formula and bottles before your baby is born to be sure you are ready after you return from the hospital. The [facility] do not provide formula samples to take home.    Be sure to follow formula mixing directions closely. Regular milk in the dairy case at the grocery store should not be given to babies under 1 year old. Baby formula is sold in several forms including:    Ready-to-use. This is the most expensive, but no mixing is necessary.    Concentrated liquid. This is less expensive than ready-to-use and you mix with water.    Powder. This is the least expensive. You mix one level scoop of powdered formula with two ounces of water and stir well.    Most babies need 2.5 ounces of formula per pound of body weight  each day. This means an 8-pound baby may drink about 20 ounces of formula a day; however, this is just an estimate. The most important thing is to pay attention to your baby s cues.  If your baby is always fussy, needs more iron or has certain food allergies, your physician may suggest you change your baby s formula to a different kind.     How do I warm my baby s bottles?  You may feed your baby a bottle without warming it first. It is OK for the breast milk or formula to be cool or room temperature. If your baby seems to prefer it warmed, you can put the filled bottle in a container of warm water and let it stand for a few minutes. Check the temperature of the liquid on your skin before feeding it to your baby; to be sure it isn t too hot. Do not heat bottles in the microwave. Microwaves heat food and liquids unevenly, and this can cause hot spots that can burn your baby.    How do I clean and sterilize bottles?  Sterilize bottles and nipples before you use them for the first time. You can do this by putting them in boiling water for 5 minutes. After that first time, you can wash them in hot and soapy water. Rinse them carefully to be sure there is no soap left on them. You can also wash them in the .    [facility] [phone] ([phone])    Share with Women\[place] I in Labor?  What is labor? Labor is the work that your body does to birth your baby. Your uterus (the womb) contracts(tightens). The contractions(labor pains) push your baby down onto your cervix(the opening ofyour uterus). Thispressure causesyour cervix to open. When your cervix iscompletely open (10 centimeters dilated), you will push your baby through your vagina and out into the world.  What do contractions feel like? When contractionsfirst start, theyusually feellike cramps duringyour period. Sometimesyoufeelpain in your back. Mostoften,contractions feel like muscles pulling painfully in your lower belly. At first, the contractions will  probably be 15 to 20 minutes apart.They maybe irregular and will not feel too painful. As labor goes on, the contractionsget stronger,closer together, more consistent, and more painful.  How do I time the contractions? When the contractionsseem to be coming regularly, youshould start to time them.You time your contractions by counting the number of minutes from the start of one contraction to the start of the next contraction.  What should I do during early labor when the contractions start? If it is night andyoucan sleep, do so. If it happensduring the day, there are some things you can do to take care of yourself at home: Walk. If the painsyou are having are reallabor, walking will makethecontractionscome closer together and they will be stronger,but you will be able to cope with them better if you are standing or moving around. If the contractions are early labor ones that come andgo (sometimes called false labor), walkingcan make them go away. Take a shower or bath. This will help you relax. Eat. Labor is a big event.Your body needs a lot of energy to be effective.Eat whatever you feel like eating. Drink water. Not drinking enough water can cause contractions to not be as effectiveas theyshould be.You need to be well hydrated (drinking enoughwater) to help your body work well during labor. Take a na p. If youfeel tired, lay down on your side and get all the rest you can. It helps to be rested when you go intoactive labor. Do something you enjoy. Spend time with family. Watch a movie. Distraction will help you relax. Get a massage. If your labor is in your back, a strong massage on your lower back may feel very good. Getting a foot massage or having a partner rub your feet can also be very relaxing. Don t panic. You can do this. Your body was made for this. You are strong!  When should I call my health care provider if I think I am in labor? Your contractions have been 5 minutes or less apart for at least an  hour. Your contractions are becoming so painful youcannot walk or talk during one. You think your amniotic sac (bag of juan) breaks. You may have a big gush of amniotic fluid (water) or just fluid that runs down your legs when you walk or move or change position.  Are there other reasons to call my health care provider? If you are concerned about anything, don t hesitate to call your health care provider.You should definitely call your health care provider or go to the hospital if:  It is 3 weeks or more before your due date, and you are having contractions.  You have vaginal bleeding that is more than your period, soaks your underwear, or runs down your legs.  You have sudden severe pain that does not go away with rest.  Your baby has not movedfor several hours.  You are leaking greenish fluid.    For More Information: http://onlinelibrary.modi.com/doi/10.1111/jmwh.93016/epdf     [place] Department of Health and Human Services: Signs oflabor,labor stages, and types of birth  http://womenshealth.gov/pregnancy/childbirth-beyond/labor-birth.html#a      Childbirth and Parenting Education:   [place] parenting center: http://WaterplayUSA/   (005) 156-BABY  [place]: (education, yoga & wellness) www.Vyu  Enlightened Mama: www.enlightenedPar-Trans Marketing.Edusoft   Childbirth collective: (Parent topic nights)  www.childbirthcollective.org/  Hypnobabies:  www.hypnobabiestwincities.com/  Hypnobirthing:  Http://hypnobirthing.com/    Book Recommendations:   Dedra Benton's Birthing From Within--first few chapters include a new-age tone, you may prefer to skip it and keep going, because there is good stuff later.  This book recommendation covers emotional preparation, but does cover coping with pain, and use of both pharmacological and nonpharmacological methods.    Dr. Martínez' The Pregnancy Book and The Birth Book--the pregnancy book goes month-by month    Womanly Art of Breastfeeding by [employer]   Bestfeeding by  "Annette Zimmerman--great pictures    Mothering Your Nursing Toddler, by Jenn Hodges.   Addresses dealing with so many of the challenging behaviors of a nursing toddler.  How Weaning Happens, by La [employer]eche League.  Discusses weaning at all ages, from medically necessary weaning of an infant, all the way up to age 5 (or older), with why/why not, and strategies.  Very empowering book both for deciding to wean and deciding not to.    American College of Nurse-Midwives (ACNM) http://www.midwife.org/; look at the informational handouts at http://www.midwife.org/Share-With-Women     www.mymidwife.org    Mother to Baby (Medication and Herbal guidance in pregnancy): http://www.mothertobaby.org  Toll-Free Hotline: 183.527.6197  LactMed (Medication use while breastfeeding): http://toxnet.nlm.nih.gov/newtoxnet/lactmed.htm    Women's Health.gov:  http://www.womenshealth.gov/a-z-topics/index.html    American pregnancy association - http://americanpregnancy.org    Centering Pregnancy (group prenatal care option): http://centeringhealthcare.org    Information about doulas:  Childbirth collective: http://www.childbirthcollective.org/  [facility] [facility] [place] (CONSUELO):  www.consuelo.org  [facility]  project: http://twincitiesdoulaproject.com/     Early Childhood and Family Education (ECFE):  ECFE offers parents hands-on learning experiences that will nourish a lifetime of teachable moments.  http://ecfe.info/ecfe-home/    March of Dimes www.marchWebLincdiPulmocide.com     FDA - Nutrition  www.mypyramid.gov  Under \"For Consumers,\" click on \"pregnant and breastfeeding women.\"      Centers for Disease Control and Prevention (CDC) - Vaccines : http://www.cdc.gov/vaccines/       When researching information on the web, question the validity of websites.  The domains .gov, .edu and.org tend to be more reliable information.  If there are a lot of advertisements, be cautious of the information provided. Stay away from blogs and chat rooms please!   "

## 2021-05-30 NOTE — PROGRESS NOTES
Barbara Aguirre is here for prenatal visit and lactation consultation oin her own today.  She is feeling well;  Feels like baby is very low.   Baby is active, no bleeding or leaking.  Not feeling any contractions.  Shingles now resolved:  Lesions visible on right side of back of neck and in front of ear, but are completely dark and crusted and she feels much better.  Discussed postpartum support and length of stay:  Plans to stay two nights.  Has family in town but she plans to limit visitors in early days.  Reviewed when to call for labor, and how to call.  She has care arranged for other child.  Requested cervical check today as with first child she birthed at 37 1/2 weeks:  1-2/60%/0 station.  Plan revisit in one week.    Lactation Consultation:    Barbara Aguirre desires prenatal lactation consultation because of difficulties breastfeeding with first child:  Baby had jaundice, was very sleepy at breast, had frenotomy before discharge due to tongue tie, and required supplementation due to jaundice and poor latch.  Attempted feeding with nipple shield, but did not find this helpful.  Baby required bili-blanket for 2-3 weeks.  Was never able to latch well after these complications, and Barbara Aguirre did exclusive pumping for 10 months. She would like to know how to proceed in case of a similar scenario this time:  Very much desires exclusive, direct breastfeeding.  Finally, Barbara Aguirre reports that although her family is helpful, they have little knowledge about breastfeeding and often encourage formula.     Has noticed breast changes in pregnancy including early pregnancy breast enlargement.     No history of breast surgeries. No Breast reduction/Breast augmentation  No Flat/inverted nipples--normal breast appearance bilaterally.  No hormonal disorders, such as thyroid problems, infertility, irregular cycles, diabetes    A:  History of lactation disorder due to painful nipples, infant jaundice, and poor latch.  History of infant with  ankyloglossia.  Family barriers to breastfeeding.    P:   1.  Discussed the importance of immediate breastfeeding and frequent skin to skin contact, definitely in the first 1-2 hours postpartum and in the first few days and week to support effective breastfeeding.  Reviewed importance of frequent breastfeeding in early days, to facilitate good intake/output and clearing of jaundice.    2.   Encouraged limiting the time her baby is swaddled and being held by other family or friends, as this can overwhelm the baby and decrease the effectiveness of breastfeeding in the few days, a crucial time for establishing breastfeeding.  3.  Recommended requesting a visit by IBCLC (lactation consultant) while on [facility] to address issues of latch and intake.  4.  Discussed the potential role of breast shells and nipple shield to assist with latch, but neither recommended at this time.   5. Discussed role of breastpump and indications for using a breastpump postpartum.  She has obtained a new breast pump this pregnancy.  Also discussed and demonstrated hand expression--discussed considering prenatal expression of colostrum to have stored for birth, if baby should need supplementation.  6.  Discussed out-patient support including a new parent group, [facility] group, and out-patient [facility] lactation services.  Also discussed [facility] and that several of the lactation counselors are Hmong;  Could assist with cultural/family issues.  7.  Discussed strategies for communicating effectively with family about value of breastfeeding, and support for concept that breast is adequate nutrition.  8. Discussed that [facility] are [facility] and what this means.  9. Encouraged early outpatient lactation visit.    Total time spent with patient= 60 mn (time spent in counseling for lactation: 40 minutes) with >50% time spent in counseling or coordination of care.

## 2021-05-30 NOTE — PATIENT INSTRUCTIONS - HE
[facility] Nurse Midwives - Contact information:  Appointment line and to get a hold of CNM in clinic Monday-Friday 8 am - 5 pm:  [phone].  There are some clinics with early start times (1st appointment 7:40 am) and others with evening hours (last appointment 6:20 pm).  Most are typically open from 8 am to 5 pm.    CNM on call answering service: [phone].  Specify your hospital of choice and leave a brief message for CNM;  will then page CNM who is on call at your specified hospital and you should receive a call back with 15 minutes.  Be sure that your ringer is audible and that you can accept blocked calls so that we can get back in touch with you! This number should be reserved for urgent needs if during the day, before 8 am, after 5 pm, weekends, holidays.    Contact the on-call CNM with warning signs, such as:    vaginal bleeding     Vaginal discharge and itching or pain and burning during urination    Leg/calf pain or swelling on one side    severe abdominal pain    nausea and vomiting more than 4-5 times a day, or if you are unable to keep anything down    fever more than 100.4 degrees F.          You are invited to  Meet the [facility] Nurse-Midwives  A way to tour the hospital Labor and Delivery unit and meet the midwives that attend births since you may not have the opportunity to meet them during your prenatal care.  Some sessions are informal meet and greet type social hours, others address a specific concern or topic.    Tuesday, Februrary 12, 2019 7-8pm  [facility]    Wednesday, April 17, 2019 7-8pm  [facility], [facility]    Thursday, August 15, 2019 7-8pm  [facility]    Wednesday November 13, 2019 7-8 pm  [facility], [facility]    Please call [phone] to register        Touring the Maternity Care Center  To schedule a tour at either [facility] or [facility], please do so online using  the following links:  [facility] - [email or web address]  [facility] - [email or web address]&[email or web address]      Car Seat Clinics:  https://[place]ps.[place].gov/divisions/ots/child-passenger-safety/Pages/car-seat-checks.aspx  [facility]    Free Car Seat Distribution Facilities     By Appt. Address Contact Information (For appointment)      \Yes [employer]\[address]\[place],\[place])-\[email or web address]      Yes [facility]\ [facility]\[place],\[place] Josey Jacques)[email or web address]      Yes [facility]\[address] [facility]\[place],\[place] Mandi Leblanc)[email or web address]      Immunizations:  http://www.cdc.gov/vaccines/schedules/easy-to-read/child.html      Postpartum Depression  The first weeks of caring for a  baby are more than a full-time job. Although it is often a happy time, your feelings and moods may not be what you expected. Many women experience  baby blues.  Here are some tips to help you understand when feelings of sadness are normal, and when you should call your health care provider.    What are the baby blues?  As many as 3 of every 4 women will have short periods of mood swings, crying, or feeling cranky or restless during the first weeks after birth. These feelings can be worse when you are tired or anxious. Women who have the baby blues often say they feel like crying but don t know why. Baby blues usually happen in the first or second week postpartum (after you give birth) and last less than a week. If you are not sleeping, becoming more upset, don t feel like you can take care of your baby, or your sadness lasts 2 weeks or more, call your health care provider.    What is postpartum  depression?  About one in every 5 women will develop depression during the first few months postpartum that may be mild, moderate, or severe. Women who have postpartum depression may have some of these symptoms:    Feeling guilty     Not able to enjoy your baby and feeling like you are not bonding with your baby      Not able to sleep, even when the baby is sleeping    Sleeping too much and feeling too tired to get out of bed    Feeling overwhelmed and not able to do what you need to during the day    Not able to concentrate    Don t feel like eating    Feeling like you are not normal or not yourself anymore    Not able to make decisions    Feeling like a failure as a mother    Feeling lonely or all alone    Thinking your baby might be better off without you  If you have any of these symptoms, call your health care provider!    Which symptoms of postpartum depression are dangerous?  Sometimes a woman with postpartum depression will have thoughts of harming herself or her baby. If you find yourself thinking about hurting yourself or your baby, call your health care provider immediately.    MOTHERHOOD: THE EARLY DAYS  You prepare for the birth of your baby for many months during pregnancy, and then the first months at home after your baby is born can be a quiet, gentle time of getting to know this new person who has come to live in your home. But for most women it is not all quiet or sweet. And for some women it is a very hard time.  What Can I Expect in the First Few Months After the Baby Comes?  New mothers and their families face many challenges in the first few months:    Your body and your hormones have to get back to normal.    You and the baby will be learning to breastfeed.    Babies only sleep a few hours at a time. The entire family will have a hard time getting enough sleep.    You and your family need to learn how to parent this new family member.    If you have a partner, you have to figure out how to  stay together as a couple and maybe even start to have sex again.    You may have to figure out how to keep from getting pregnant again right away.    You may need to return to work and find day care.    How Long Will it Take for My Body to Get Back to Normal?  Some changes will occur quickly. Others will not occur as quickly.    Your uterus, cervix, and vagina will all shrink to their nonpregnant size in about 2 weeks. Your vagina may be tender and dry for a few months--especially if you are breastfeeding.    If you had stitches or hemorrhoids, your   bottom   will be sore for 2 weeks or more.    For some women who have problems urinating, it can take several months for you to be able to hold your urine when you cough or sneeze or suddenly  something heavy.    Your breast milk will   come in   2 to 3 days after the birth of your baby. It will take 6 to 8 weeks for you and the baby to get the hang of breastfeeding and find a pattern. During these first weeks, you can have engorged breasts at times and often leak milk.    Your stomach and intestines all have to fall back into place. You may have a lot of gas for a few weeks.    You may be constipated--especially if you are breastfeeding.    Your stretched stomach muscles can recover in a few weeks, but for some women it takes longer--6 months or a year--to recover.    If you had a  delivery, you may have pain or numbness around the incision for 6 months or more.    Losing the weight you gained during pregnancy will probably take 6 months to a year. Have patience! It took 40 weeks to get here. Give yourself 40 weeks to get back.    What Can I Expect When My Hormones Change?  About 75% of all women will get the   blues.   This usually starts about 3 days after the birth of your baby. You may cry easily and feel very, very tired. A few women become very depressed. If you had a  delivery or your new baby was sick, you are at a higher risk for  depression.  Call your health care provider right away if you cannot care for yourself or your baby, if you feel very nervous or worried, if you cannot stop crying, or if you are having thoughts of hurting yourself or your baby.    Taking Care of Yourself  While you are still pregnant:    Talk with your partner and your family about the time ahead. Arrange for someone to help you during the first weeks at home if you can.    Talk with your health care provider about birth control options and make a plan before the baby comes.    If you are worried about how to parent a , take parenting classes. You will learn a lot about how babies act and you will make some friends who are going through the same thing at the same time. Most [facility] have these classes.    Arrange for someone to help with baby care if you can.  After the baby comes:    Ask for help. Let other people do the cooking and cleaning and run the house. Focus on yourself and your baby.    Sleep whenever you can. Try not to be tempted to   get some things done   when the baby sleeps. This is your time to sleep, too.    Drink lots of water. You will need at least 6 big glasses of water everyday to avoid constipation and make enough breast milk. Every time you sit down to breastfeed, have a big glass of water with you to drink while you are nursing.    Eat lots of vegetables and fruit. You will need lots of vitamins and fiber to help your body get back to normal. This will also help you avoid constipation.    Go outside and walk. Babies can go outside even if it is very cold. Fresh air and sunshine will do you both good.    Take sitz baths. Put about 6 inches of warm water in your bathtub and sit in there for 15 minutes 2 to 3 times a day. This will help your   bottom   heal more quickly. It will also give you 15 minutes of private time!    Talk to other mothers. Join a new parents group. Call Jennie and go to [facility] meetings if you are  breastfeeding.     With your partner:    Keep talking. Share the experience.    Spend time alone. Even a 30-minute walk can be a date.    Start a birth control method. You can get pregnant before you even have a period. It is very important to use birth control if you do not want to get pregnant again right away.    When you have sex, use a lubricant. A lot of lubricant! Take it slow.  The first few months after a baby comes can be a lot like floating in a jar of honey--very sweet and bell, but very sticky, too. Take time to enjoy the good parts. Remind yourself that this time will pass. Bon voyage!    FOR MORE INFORMATION  For questions about depression during and after pregnancy:  http://www.womenshealth.gov/publications/our-publications/fact-sheet/depression-pregnancy.html   After birth: The first 6 weeks:  http://www.[employer]/Post-Birth-and-Recovery   Breastfeeding resources:  http://www.[employer].org/health-info/getting-breastfeeding-off-to-a-good-start/    HEALTHY PREGNANCY CARE: 37 to 41 WEEKS PREGNANT    Talk with your midwife or physician about when to call with signs of labor    Regular uterine contractions that are getting closer together and/or stronger    If you think your water has broken or is leaking    Bleeding from the vagina like a period (bloody vaginal discharge is normal)    If you are not feeling your baby move    Make plans for transportation and  as needed for when you are going to the hospital.    Your midwife or physician may offer to check your cervix for changes.     Ask your health care provider about vaccinations you may need following delivery. By now, you should have received a Tdap immunization to protect against pertussis or whooping cough. Fathers and family members who will be in close contact with the baby should also receive a Tdap shot at least two weeks before the expected birth of the baby if they have not had a Td (tetanus) shot for at least  two years.    If you are past your due date, discuss the next steps leading to delivery with your midwife or physician. If you don't start labor on your own by 41 or 42 weeks, your midwife or physician may recommend giving you medicines to ripen your cervix and start labor.    Preparing for your baby: Tell your midwife or physician how you plan to feed your baby (breast or bottle), who you have chosen to do pediatric care for your baby, and if you have a boy, whether you have chosen to have him circumcised. You will need a car seat correctly installed in your vehicle to bring your baby home. As you start to set up the nursery at home for your baby, make sure the crib is safe. The mattress needs to fit snugly against the edges of the crib. If you can fit a soda can between the bars, they are too far apart and can allow the baby's head to caught between them.    Learn about infant care and feeding, including information about infant CPR. We recommend that you put your baby to sleep on his or her back to reduce the chance of Sudden Infant Death Syndrome (SIDS). To maintain a healthy environment in which your child can grow, it's best to keep your home smoke-free. By preparing ahead, your transition into parenthood will go smoothly for you and your baby.    Your midwife or physician will want to see you for a checkup 2 to 6 weeks after delivery.    If you have questions about any symptoms you are experiencing or any other concerns, call your provider or their clinic staff at [facility] at Phone: [phone]. If it's after clinic hours, physician patients should call the [facility] at [phone] ([phone]); midwife patients should call their answering service at [phone].    How can you care for yourself at home?   You can refer to the Starting Out Right book or find it online at http://www.[facility].org/images/stories/maternity/[facility]-Starting-Out-Right.pdf or  http://www.[facility].org/images/stories/flipbooks/[facility]-starting-out-right/[facility]-starting-out-right.html#p=8     You can sign up for a weekly parenting e-mail that gives support, tips and advice from health care professionals that starts with pregnancy and continues through the toddler years. To register, go to [email or web address] at any time during your pregnancy.        Making Plans for Feeding My Baby    By this point, you probably have read a lot about feeding your baby.  Breastfeed or formula? Each mother s decision is her own and [facility] respects you and your choices. We ve gathered information on both breastfeeding and formula feeding to help with your decision. Talking with your physician or nurse-midwife can also help in your decision.  However you plan to feed your baby, [facility] encourage rooming in with your baby, skin-to-skin contact and feeding your baby based on his or her cues.    Skin-to-skin contact  Being close to mom helps your baby adjust to life outside of the womb.  It helps your baby regulate their body temperature, heart rate, and breathing.  Your baby will usually be placed skin-to-skin immediately following birth or as soon as possible, if medical intervention is needed.    Rooming-In  Having your baby stay with you in your room is called  rooming-in .  Keeping your baby in your room helps you to learn how to care for your baby by getting to know your baby s cues, body rhythms and sleep cycle.       Cue-based feeding  Cues (signals) are baby s way of telling you what he or she wants.  When you learn your infant s cues, you know how to care for and feed your baby.   Feeding cues are the licking and smacking of lips, bringing their fist to their mouth, and a reflex called  rooting - where baby turns and opens his or her mouth, searching for the breast or bottle.  Crying is a late feeding cue.  Babies can feed frequently, often at least 8 times in 24  hours.  Breastfeeding facts  Breast milk is the best source of nutrition for your baby and is available at birth. In the first couple of days, your milk volume is already starting to increase, though it may not be noticeable. Breastfeed frequently to increase your milk supply. Within three to five days, you will begin to notice larger milk volumes. An increase in breast size, heaviness and firmness are often described as the milk  coming in.  Frequent breastfeeding can help breasts from getting overly firm and painful. You will know the baby is getting enough milk if your baby is having wet and dirty diapers and gaining weight.     If your goal is to exclusively breastfeed, it is important to not use any formula or artificial nipples (including bottles and pacifiers) while your baby is learning to breastfeed.  While it may seem like an  easy  option to give your baby a bottle, formula should only be given if there is a medical reason for your baby to have it.    Positioning and attachment   Get comfortable.  Use pillows as needed to support your arms and baby.  Hold baby close at the level of your breast, facing you in a tummy to tummy position.  Skin to skin helps with this.  Position the baby with his or her nose by the nipple.  There should be a straight line from baby s ear to shoulder to hips.  Tickle your baby s lips or wait for baby to open mouth wide, bring baby to breast by leading with the chin.  Aim the nipple at the roof of baby s mouth.  A rapid sucking pattern is followed by longer, drawing pattern with occasional swallows heard.  When baby is correctly latched, your nipple and much of the areola are pulled well into baby s mouth.      Returning to work or school  Focus on a good start to breastfeeding.  Many women continue to provide breastmilk for their baby when they return to work or school.  Making plans about where to pump and store milk can make the transition go well.  Talk with other mothers  who have also returned to work or school for tips and support.  Your employer s Human Resource department may be a resource as well.     Returning to work or school: (continued)     babies can mean fewer  sick  days for you.    A quality breast pump will also save time and add comfort.  Check with your insurance prior to giving birth for breast pump coverage.  Many insurance companies include a pump within your benefits.    Wait until your baby is at least three weeks old to introduce a bottle for the first time.  Have someone besides you give the bottle.    Breastfeed when you are with your baby. Reserve your bottles of breast milk for when you are away.     Your breasts will need to be  emptied  either by your baby or a pump.  Plan to pump at least twice in an eight hour day.    If you cannot pump at work, continue breastfeeding at home. Any amount of breast milk is worth giving to your baby.    Formula feeding facts  If you are planning to use formula to feed your baby, you will want to make some preparations ahead of time. Talk to your doctor or nurse-midwife about what type of formula to use. Some are iron-fortified, meaning they have extra iron in them. You will want to purchase formula and bottles before your baby is born to be sure you are ready after you return from the hospital. The [facility] do not provide formula samples to take home.    Be sure to follow formula mixing directions closely. Regular milk in the dairy case at the grocery store should not be given to babies under 1 year old. Baby formula is sold in several forms including:    Ready-to-use. This is the most expensive, but no mixing is necessary.    Concentrated liquid. This is less expensive than ready-to-use and you mix with water.    Powder. This is the least expensive. You mix one level scoop of powdered formula with two ounces of water and stir well.    Most babies need 2.5 ounces of formula per pound of body weight  each day. This means an 8-pound baby may drink about 20 ounces of formula a day; however, this is just an estimate. The most important thing is to pay attention to your baby s cues.  If your baby is always fussy, needs more iron or has certain food allergies, your physician may suggest you change your baby s formula to a different kind.     How do I warm my baby s bottles?  You may feed your baby a bottle without warming it first. It is OK for the breast milk or formula to be cool or room temperature. If your baby seems to prefer it warmed, you can put the filled bottle in a container of warm water and let it stand for a few minutes. Check the temperature of the liquid on your skin before feeding it to your baby; to be sure it isn t too hot. Do not heat bottles in the microwave. Microwaves heat food and liquids unevenly, and this can cause hot spots that can burn your baby.    How do I clean and sterilize bottles?  Sterilize bottles and nipples before you use them for the first time. You can do this by putting them in boiling water for 5 minutes. After that first time, you can wash them in hot and soapy water. Rinse them carefully to be sure there is no soap left on them. You can also wash them in the .    [facility] [phone] ([phone])    Share with Women\[place] I in Labor?  What is labor? Labor is the work that your body does to birth your baby. Your uterus (the womb) contracts(tightens). The contractions(labor pains) push your baby down onto your cervix(the opening ofyour uterus). Thispressure causesyour cervix to open. When your cervix iscompletely open (10 centimeters dilated), you will push your baby through your vagina and out into the world.  What do contractions feel like? When contractionsfirst start, theyusually feellike cramps duringyour period. Sometimesyoufeelpain in your back. Mostoften,contractions feel like muscles pulling painfully in your lower belly. At first, the contractions will  probably be 15 to 20 minutes apart.They maybe irregular and will not feel too painful. As labor goes on, the contractionsget stronger,closer together, more consistent, and more painful.  How do I time the contractions? When the contractionsseem to be coming regularly, youshould start to time them.You time your contractions by counting the number of minutes from the start of one contraction to the start of the next contraction.  What should I do during early labor when the contractions start? If it is night andyoucan sleep, do so. If it happensduring the day, there are some things you can do to take care of yourself at home: Walk. If the painsyou are having are reallabor, walking will makethecontractionscome closer together and they will be stronger,but you will be able to cope with them better if you are standing or moving around. If the contractions are early labor ones that come andgo (sometimes called false labor), walkingcan make them go away. Take a shower or bath. This will help you relax. Eat. Labor is a big event.Your body needs a lot of energy to be effective.Eat whatever you feel like eating. Drink water. Not drinking enough water can cause contractions to not be as effectiveas theyshould be.You need to be well hydrated (drinking enoughwater) to help your body work well during labor. Take a na p. If youfeel tired, lay down on your side and get all the rest you can. It helps to be rested when you go intoactive labor. Do something you enjoy. Spend time with family. Watch a movie. Distraction will help you relax. Get a massage. If your labor is in your back, a strong massage on your lower back may feel very good. Getting a foot massage or having a partner rub your feet can also be very relaxing. Don t panic. You can do this. Your body was made for this. You are strong!  When should I call my health care provider if I think I am in labor? Your contractions have been 5 minutes or less apart for at least an  hour. Your contractions are becoming so painful youcannot walk or talk during one. You think your amniotic sac (bag of juan) breaks. You may have a big gush of amniotic fluid (water) or just fluid that runs down your legs when you walk or move or change position.  Are there other reasons to call my health care provider? If you are concerned about anything, don t hesitate to call your health care provider.You should definitely call your health care provider or go to the hospital if:  It is 3 weeks or more before your due date, and you are having contractions.  You have vaginal bleeding that is more than your period, soaks your underwear, or runs down your legs.  You have sudden severe pain that does not go away with rest.  Your baby has not movedfor several hours.  You are leaking greenish fluid.    For More Information: http://onlinelibrary.modi.com/doi/10.1111/jmwh.65174/epdf     [place] Department of Health and Human Services: Signs oflabor,labor stages, and types of birth  http://womenshealth.gov/pregnancy/childbirth-beyond/labor-birth.html#a      Childbirth and Parenting Education:   [place] parenting center: http://Chi2gel/   (053) 276-BABY  [place]: (education, yoga & wellness) www.[employer]  Enlightened Mama: www.enlightenedQinti.Mindbloom   Childbirth collective: (Parent topic nights)  www.childbirthcollective.org/  Hypnobabies:  www.hypnobabiestwincities.com/  Hypnobirthing:  Http://hypnobirthing.com/    Book Recommendations:   Dedra [place]'s Birthing From Within--first few chapters include a new-age tone, you may prefer to skip it and keep going, because there is good stuff later.  This book recommendation covers emotional preparation, but does cover coping with pain, and use of both pharmacological and nonpharmacological methods.    Dr. Martínez' The Pregnancy Book and The Birth Book--the pregnancy book goes month-by month    Womanly Art of Breastfeeding by La Leche League International   Bestfeeding by  "Annette Zimmerman--great pictures    Mothering Your Nursing Toddler, by Jenn Hodges.   Addresses dealing with so many of the challenging behaviors of a nursing toddler.  How Weaning Happens, by La Leche League.  Discusses weaning at all ages, from medically necessary weaning of an infant, all the way up to age 5 (or older), with why/why not, and strategies.  Very empowering book both for deciding to wean and deciding not to.    American College of Nurse-Midwives (ACNM) http://www.midwife.org/; look at the informational handouts at http://www.midwife.org/Share-With-Women     www.mymidwife.org    Mother to Baby (Medication and Herbal guidance in pregnancy): http://www.mothertobaby.org  Toll-Free Hotline: 562.884.5797  LactMed (Medication use while breastfeeding): http://toxnet.nlm.nih.gov/newtoxnet/lactmed.htm    Women's Health.gov:  http://www.womenshealth.gov/a-z-topics/index.html    American pregnancy association - http://americanpregnancy.org    Centering Pregnancy (group prenatal care option): http://centeringhealthcare.org    Information about doulas:  Childbirth collective: http://www.childbirthcollective.org/  Doulas of [place] (CONSUELO):  www.consuelo.org  [facility]  project: http://twincitiesdoulaproject.com/     Early Childhood and Family Education (ECFE):  ECFE offers parents hands-on learning experiences that will nourish a lifetime of teachable moments.  http://ecfe.info/ecfe-home/    March of Dimes www.marchFreeBriedigantto.com     FDA - Nutrition  www.mypyramid.gov  Under \"For Consumers,\" click on \"pregnant and breastfeeding women.\"      Centers for Disease Control and Prevention (CDC) - Vaccines : http://www.cdc.gov/vaccines/       When researching information on the web, question the validity of websites.  The domains .gov, .edu and.org tend to be more reliable information.  If there are a lot of advertisements, be cautious of the information provided. Stay away from blogs and chat rooms please! "

## 2021-05-30 NOTE — PROGRESS NOTES
Pt presented to [facility] upset and crying c/o decreased fetal movement. Admitted to room 12, vss, afebrile, monitors applied FHT normal.  Reatcive NST now, pt feeling baby move. Updated Raeann Lewis CNM ok to discharge home.

## 2021-05-30 NOTE — PATIENT INSTRUCTIONS - HE
Use the topical steroid to the area    Can use 25mg Benadryl every 4 hours for rash.    If no improvement by Monday, please be seen again for recheck.

## 2021-05-30 NOTE — PROGRESS NOTES
ASSESSMENT:   1. Rash  triamcinolone (KENALOG) 0.1 % cream       PLAN:  Unclear as to the etiology of the rash, however does appear that these could be insect bites.  Patient prescribed triamcinolone to use topically, can use 25 mg Benadryl for symptoms as well.  We will follow-up with her primary care provider should symptoms persist another 3 days.    I discussed red flag symptoms, return precautions to clinic/ER and follow up care with patient/parent.  Expected clinical course, symptomatic cares advised. Questions answered. Patient/parent amenable with plan.    Patient Instructions:  Patient Instructions   Use the topical steroid to the area    Can use 25mg Benadryl every 4 hours for rash.    If no improvement by Monday, please be seen again for recheck.      SUBJECTIVE:   Barbara Walsh is a 28 y.o. female who presents today with a rash to the back of her neck which started on Tuesday (today being Friday).  There are several lesions to the posterior neck as well as on the scalp, notes that there was one on her upper back that has resolved.  She has not developed any new lesions since Tuesday.  They are not itchy, however they have a stinging sensation.  She is not aware that she was bitten by an insect.  No one at home has a similar rash.  She has not had fevers or ill symptoms.  She denies the use of new soaps, products, detergents.  She is currently 35 weeks pregnant.  She has not taken any new medications, and has not tried anything to help alleviate the symptoms.      ROS:  Comprehensive 12 pt ROS completed, positives noted in HPI, otherwise negative.      Past Medical History:  Patient Active Problem List   Diagnosis     Environmental allergies     Encounter for supervision of other normal pregnancy in third trimester     Need for rubella vaccination     Abnormal maternal glucose tolerance, antepartum     Excessive weight gain during pregnancy in second trimester       Surgical History:  Past Surgical History:    Procedure Laterality Date     WISDOM TOOTH EXTRACTION N/A     bottom teeth           Family History:  Family History   Problem Relation Age of Onset     Hearing loss Mother      No Medical Problems Father      Stroke Maternal Grandfather      Diabetes Paternal Uncle      Heart disease Paternal Aunt      Breast cancer Neg Hx      Cancer Neg Hx      Colon cancer Neg Hx        Reviewed; Non-contributory    Social History     Tobacco Use   Smoking Status Never Smoker   Smokeless Tobacco Never Used       Current Medications:  Current Outpatient Medications on File Prior to Visit   Medication Sig Dispense Refill     acetaminophen (TYLENOL) 325 MG tablet Take 1-2 tablets (325-650 mg total) by mouth every 4 (four) hours as needed.  0     calcium, as carbonate, (TUMS) 200 mg calcium (500 mg) chewable tablet Chew 1 tablet daily.       cetirizine HCl (ZYRTEC ORAL) Take by mouth.       fluticasone (FLONASE) 50 mcg/actuation nasal spray 1 spray into each nostril daily.       prenatal #115-iron-folic acid 29 mg iron- 1 mg Chew Chew daily.       Current Facility-Administered Medications on File Prior to Visit   Medication Dose Route Frequency Provider Last Rate Last Dose     [DISCONTINUED] lactated Ringers  0-500 mL/hr Intravenous Continuous Raeann Lewis APRN,CHRISTINE           Allergies:   No Known Allergies    OBJECTIVE:   Vitals:    07/12/19 1704   BP: 102/69   Patient Site: Left Arm   Patient Position: Sitting   Cuff Size: Adult Regular   Pulse: (!) 103   Resp: 16   Temp: 97.9  F (36.6  C)   TempSrc: Oral   SpO2: 98%   Weight: 141 lb (64 kg)     Physical exam reveals a pleasant 28 y.o. female.   General: Appears healthy, alert and cooperative. Non-toxic appearance.  Pulmonary/Chest: Chest is clear, no wheezing, rhonchi or rales. Symmetric air entry throughout both lung fields. Symmetrical chest wall movement.  Heart: regular rate and rhythm, no murmur, rub or gallop  Abdomen: soft, nontender. No masses or  organomegaly  Neuro: Alert, oriented. Non-focal.  Skin: pink, warm, dry.  There are several erythematous wheal-like lesions noted to the posterior neck as well as the base of the scalp.  There is no crusting or drainage.  No evidence of secondary infection.  Within one lesion is a very small central punctate.  Psychiatric: Normal mood and affect.  Normal judgement and thought content. Normal behavior.       RADIOLOGY    none  LABORATORY STUDIES    none      Anne Zambrano, CNP

## 2021-05-30 NOTE — PATIENT INSTRUCTIONS - HE
Good breastfeeding resources:    www.transOMIC  The Baby Book:  Dr. Chavez and Freda Martínez  The Womanly Art of Breastfeeding by LLL    ____________________________________________    For good videos on the laid-back breastfeeding position:  Www.Helical IT Solutionsastfeeding.Xtreme Power.Crowned Grace International    ____________________________________________    Resources for Support:      New Parent Group at [facility]--meets every Tuesday at 12:30 pm in the Auditorium, no fee or registration required    [facility] [facility]--New Mama Group (free)  https://www.[employer]GATE Holding/    Enlightened Mama--Parent Groups for $5/session  https://www.Ezetap/classes-and-events/    La Leche LeAd.IQ--free peer support, led by trained leaders  http://www.lllofmndas.org/find-a-group.html    [facility] Baby Cafe's--provide baby weights and support  http://www.babycafeusa.org/your-nearest-baby-cafe/us-baby-cafes-2.html    MOMS Club International:  Support and parent/child daytime activities for stay-at-home mothers or moms working part-time  https://momsclub.org/find-a-chapter/      ____________________________________    -------------------------------------------------------------------------------------------------  3 Reliable Signs That Your Baby Is Getting Enough Milk   By Yesenia PENA in Baby. Last updated on July 29, 2015  (from www.Free For Kids.Chronicle Solutions.au)  Many parents worry if their baby is getting enough milk. In fact, this is one of the main reasons why many mothers stop breastfeeding.  This isn t all that surprising given that our society sets parents up with many unrealistic expectations. For example, many parents feel like they are doing something  wrong  if their baby of  X  age isn t sleeping through the night or is feeding  too much .  The truth is that there are many individual -- and very normal -- variations when it comes to sleeping habits and feeding frequency. As long as a baby  is showing reliable signs of getting enough milk, then their individual sleeping and feeding patterns are just that - individual. Unfortunately, many parents are given unreliable information about how to tell if their baby is getting enough milk. Here are 3 reliable signs that your baby is getting enough milk:    Sign #1: Your Baby s Growth. Babies who are getting enough milk will put on enough weight, and grow in length and head circumference. It is normal for babies to lose a small amount of weight in the early days. According to [place] s National Health and Medical Research [place] (NHMRC), a weight loss of up to 10% of their birth weight is normal for a baby during the first week. A baby would then ideally be at least back up to their birth weight by 2 weeks. Thereafter, there is wide variation in average weekly weight gains. Some weeks, a baby may put on quite a bit of weight and other weeks not so much. According to the [facility], on average, a baby puts on the following amounts per week:  0-3 months - 150-200g (5-7oz)  3-6 months - 100-150g (3-5oz)  6-12 months - 70-90g (2-3 oz)  A baby s birth weight typically doubles in the first 6 months. By 12 months, babies are typically 2.5 times their birth weight, 1.5 times the length they were at birth, and their head circumference typically has increased by about 7.6cm.    Sign #2: Poos. A baby who is getting enough milk will do enough poos.  The first poos a baby does are thick, sticky and black (meconium). After 24-48 hours, the poo is brownish and gradually continues to lighten in colour and become runnier.  By around day 5, a baby is usually doing the typical  babies  poos which are runny (sometimes with little milk curds) and yellow-mustardy (occasionally green or orange) in colour. If a baby s poos are gradually lightening in colour as described above, this is a good sign that the baby is getting enough milk.  From around day 5 onwards, if   babies are getting enough milk, they do at least 3 poos every day. Many  babies will do many more than 3! A few healthy  babies do only one poo each day but the poo is really big (eg fills the whole nappy and more!). If formula is given to a baby, this often reduces the frequency of poos and also changes the colour, consistency and smell of the poos. After 6 weeks or so, some  babies don t poo as often. Some may only poo every few days or so. As long as the poo is runny and there s a lot of it, there should be no cause for concern.    Sign #3: Wees. A baby who is getting enough milk will do enough wees. For the first 5 days, a baby will have at least as many wet nappies as the number of days old they are. For example, at least 1 wet nappy on day 1, at least 2 wet nappies on day 2 and so on.  From day 5 onwards, a baby who is getting enough milk will have at least 5 very wet disposable nappies every 24 hours, or at least 6-8 very wet cloth nappies every 24 hours. The wee should be pale in colour and non-offensive smelling. So, next time you are worried if your baby is getting enough milk, think about the above reliable signs and chat with your GP, child health nurse or lactation consultant. Be sure to become familiar with the unreliable signs that your baby is getting enough milk too.    **Unreliable Signs That Your Baby Is Getting Enough Milk  by Yesenia Ambrocio IBCLC  in Breastfeeding. Last updated on July 29, 2015    When it comes to trying to figure out if your baby is getting enough milk, it is important to be aware of what the reliable signs and unreliable signs of adequate milk intake are. Unfortunately, there are times when a baby is showing reliable signs of getting enough milk but unreliable signs are relied upon. This may lead to unnecessary formula supplementation and subsequent lowering of a mother s milk supply.  Here are 5 of the most common unreliable signs that your baby is  getting enough milk.    Unreliable Sign #1: Baby Takes More Milk From A Bottle After A Breastfeed  Many babies find sucking very pleasurable. Being hungry is only one reason why babies like to suck. They may also want to suck if they are tired or have some pain etc. Also, when something (e.g. a dummy, a finger or a bottle teat) is inserted into a young baby s mouth, their instinct is to suck. When a baby sucks at the breast, they are in control of how much they drink. They can suck in a way where they are swallowing the milk (nutritive sucking) or they can suck in a way where they aren t actually swallowing the milk (non-nutritive sucking). They can also be at the breast and not suck at all or they can just come off. So, no matter how many times a  baby breastfeeds or for whatever reason, they will remain in control of their intake. On the other hand, when a baby drinks from a bottle, they cannot suck in a non-nutritive way. A firm bottle teat in a baby s mouth provides a strong stimulus for a baby to suck and when they suck, they get milk whether they need the milk or not. The relatively fast and continuous flow of milk from the bottle means the baby has to keep sucking or else be flooded with milk.  In these ways, a baby may drink more milk from a bottle after a breastfeed, even if they are getting enough milk from breastfeeding alone. Hence, a baby drinking more milk from a bottle after a breastfeed is an unreliable sign to assess if your baby is getting enough milk.    Unreliable Sign #2: How Your Breasts Feel  In the early weeks, it s normal for a mother s beasts to make more milk than what her baby is drinking. At this time, many mothers feel that their breasts can feel full (and sometimes sore). After the early weeks -- by around 6 weeks for most mothers -- a mother s supply usually settles down to be in sync with how much her baby is drinking. At this time, most mothers find that their breasts feel  comparatively soft and comfortable. Many mothers also feel their let-down reflex less strongly than what they may have in the early weeks. All this is completely normal. Hence, how your breasts feel is an unreliable sign to tell if your baby is getting enough milk.    Unreliable Sign #3: How Much You Can -- Or Cannot -- Express  Babies are much better at extracting milk from your breasts than a pump, and expressing is a skill that needs practice. Even with practice there are some mothers who have a hearty supply but who find expressing hard. Also, babies drink varying amounts of breastmilk at each feed and the volume of milk a mother s breasts make varies throughout the day. Most mothers find that they make larger volumes of milk in the morning as compared to later in the day. This is completely normal. The late afternoon/early evening is the time of the day when many babies are unsettled and cluster feed. So if a mother judges her overall supply by the volume of milk she expresses later in the day as compared to in the morning, she may be more worried (either way is unreliable though). Also, if a mother was to express straight after her baby  versus just before her baby , the volumes are likely to be very different too. Hopefully it s now clear that how much you can or cannot express is an unreliable sign as to whether your baby is getting enough milk.    Unreliable Sign #4: Your Baby s Behaviour  It is normal for young babies (under the age of about 3 months) to have one or two unsettled periods every 24 hours. During unsettled periods, babies tend to cry a lot, sleep very little (if at all) and feed more frequently (or cluster feed). These unsettled periods tend to peak at around 6 weeks and then tend to ease off from around 2 months and then are pretty much non-existent after 3 months. In addition, some babies may have a food sensitivity, reflux, lactose overload, colic, secondary lactose  intolerance or just be a  high-needs  baby. Such babies may be even more unsettled but may still be getting enough milk. Hence, your baby s behaviour is also an unreliable sign to determine if your baby is getting enough milk.    Unreliable Sign #5: How Often Your Baby Feeds  Most exclusively  babies feed 8-12 times every 24 hours, particularly in the early weeks. However, research tells us that healthy and thriving  babies feed anywhere between 6 and 18 times in a 24 hour period! What a range of normal! Also, different mothers have different storage capacities. Storage capacity refers to how much milk a mother can store in her breasts between feeds. While most mothers are able to make plenty of milk for their baby (or babies), some mothers have a small storage capacity while others have a large storage capacity. A mother with a small storage capacity often needs to feed her baby more frequently to ensure her baby gets enough milk. Meanwhile, a mother with a large storage capacity might need to feed her baby less often for her baby to get what enough milk. Hence, how often a baby feeds, is another unreliable sign to tell if your baby is getting enough milk. The important thing is that a baby is fed according to their own individual needs by looking out for hunger cues. If you are worried about whether your baby is getting enough milk, be sure to check out what the reliable signs are. Also, speak with your GP, child health nurse or lactation consultant.

## 2021-05-30 NOTE — PATIENT INSTRUCTIONS - HE
[facility] Nurse Midwives - Contact information:  Appointment line and to get a hold of CNM in clinic Monday-Friday 8 am - 5 pm:  [phone].  There are some clinics with early start times (1st appointment 7:40 am) and others with evening hours (last appointment 6:20 pm).  Most are typically open from 8 am to 5 pm.    CNM on call answering service: [phone].  Specify your hospital of choice and leave a brief message for CNM;  will then page CNM who is on call at your specified hospital and you should receive a call back with 15 minutes.  Be sure that your ringer is audible and that you can accept blocked calls so that we can get back in touch with you! This number should be reserved for urgent needs if during the day, before 8 am, after 5 pm, weekends, holidays.    Contact the on-call CNM with warning signs, such as:    vaginal bleeding     Vaginal discharge and itching or pain and burning during urination    Leg/calf pain or swelling on one side    severe abdominal pain    nausea and vomiting more than 4-5 times a day, or if you are unable to keep anything down    fever more than 100.4 degrees F.         You are invited to  Meet the [facility] Nurse-Midwives  A way to tour the hospital Labor and Delivery unit and meet the midwives that attend births since you may not have the opportunity to meet them during your prenatal care.  Some sessions are informal meet and greet type social hours, others address a specific concern or topic.    Thursday, Februrary 22, 2018 7-8pm  [facility]  Social Hour    Thursday, April 19, 2018 7-8pm  [facility], [facility]  Exercise, nutrition and weight gain    Tuesday, June 28, 2018 7-8pm  [facility]  Postpartum depression/anxiety    Thursday August 23, 2018 7-8 pm  [facility], Auditorum A  Physiology of birth and breastfeeding, Pediatrician presentation    Thursday October 18,  2018  7-8pm,  [facility], Auditorium A  Social Hour    Please call [phone] to register        Touring the Maternity Care Center  To schedule a tour at either [place] or [facility], please do so online using the following links:  [facility] - [email or web address]&[email or web address]&[email or web address]  [facility] - [email or web address]/[email or web address]&[email or web address]&[email or web address]    Childbirth and Parenting Education:   [facility]: http://[facility]/   [phone] 470-BABY  [place]: (education, yoga & wellness) www.ExactFlat  Enlightened Mama: www.[facility].watAgame   Childbirth collective: (Parent topic nights)  www.childbirthcollective.org/  Hypnobabies:  www.hypnobabiestwincities.com/  Hypnobirthing:  Http://hypnobirthing.com/    Book Recommendations:   Dedra Fabi's Birthing From Within--first few chapters include a new-age tone, you may prefer to skip it and keep going, because there is good stuff later.  This book recommendation covers emotional preparation, but does cover coping with pain, and use of both pharmacological and nonpharmacological methods.    Dr. Martínez' The Pregnancy Book and The Birth Book--the pregnancy book goes month-by month    Womanly Art of Breastfeeding by La Leche League International   Bestfeeding by Annette Zimmerman--great pictures    Mothering Your Nursing Toddler, by Jenn Hodges.   Addresses dealing with so many of the challenging behaviors of a nursing toddler.  How Weaning Happens, by La Leche League.  Discusses weaning at all ages, from medically necessary weaning of an infant, all the way up to age 5 (or older), with why/why not, and strategies.  Very empowering book both for deciding to wean and deciding not to.    American College of Nurse-Midwives (ACNM) http://www.midwife.org/; look at the informational handouts at  "http://www.midwife.org/Share-With-Women     www.mymidwife.org    Mother to Baby (Medication and Herbal guidance in pregnancy): http://www.mothertobaby.org  Toll-Free Hotline: 142.744.6366  LactMed (Medication use while breastfeeding): http://toxnet.nlm.nih.gov/newtoxnet/lactmed.htm    Women's Health.gov:  http://www.womenshealth.gov/a-z-topics/index.html    American pregnancy association - http://americanpregnancy.org    Centering Pregnancy (group prenatal care option): http://centeringhealthcare.org    Information about doulas:  Childbirth collective: http://www.childbirthcollective.org/  [employer] [place] (CONSUELO):  www.consuelo.org  The [employer]  project: http://ShoeDazzlecitiesdoulaproject.com/     Early Childhood and Family Education (ECFE):  ECFE offers parents hands-on learning experiences that will nourish a lifetime of teachable moments.  http://ecfe.info/ecfe-home/     Dim www.marchofJedox AGmes.com     FDA - Nutrition  www.mypyramid.gov  Under \"For Consumers,\" click on \"pregnant and breastfeeding women.\"      Centers for Disease Control and Prevention (CDC) - Vaccines : http://www.cdc.gov/vaccines/       When researching information on the web, question the validity of websites.  The SalesPredict .gov, .edu and.org tend to be more reliable information.  If there are a lot of advertisements, be cautious of the information provided. Stay away from blogs and chat rooms please!     [place] Screening Program  Http://www.health.[facility].mn.us/newbornscreening/  [place] newborns are tested soon after birth for more than 50 hidden, rare disorders, including hearing loss and critical congenital heart disease (CCHD). This site provides resources and information for families and providers.    HEALTHY PREGNANCY CARE: 37 to 41 WEEKS PREGNANT    Talk with your midwife or physician about when to call with signs of labor    Regular uterine contractions that are getting closer together and/or stronger    If you think your " water has broken or is leaking    Bleeding from the vagina like a period (bloody vaginal discharge is normal)    If you are not feeling your baby move    Make plans for transportation and  as needed for when you are going to the hospital.    Your midwife or physician may offer to check your cervix for changes.     Ask your health care provider about vaccinations you may need following delivery. By now, you should have received a Tdap immunization to protect against pertussis or whooping cough. Fathers and family members who will be in close contact with the baby should also receive a Tdap shot at least two weeks before the expected birth of the baby if they have not had a Td (tetanus) shot for at least two years.    If you are past your due date, discuss the next steps leading to delivery with your midwife or physician. If you don't start labor on your own by 41 or 42 weeks, your midwife or physician may recommend giving you medicines to ripen your cervix and start labor.  Induction of labor: http://onlinelibrary.modi.com/store/10.1016/j.jmwh.2008.04.018/asset/j.jmwh.2008.04.018.pdf?v=1&t=khtk4sph&q=45th003w5kk48b76i2e8mn0m011353t8gy0hn387    Preparing for your baby: Tell your midwife or physician how you plan to feed your baby (breast or bottle), who you have chosen to do pediatric care for your baby, and if you have a boy, whether you have chosen to have him circumcised. You will need a car seat correctly installed in your vehicle to bring your baby home. As you start to set up the nursery at home for your baby, make sure the crib is safe. The mattress needs to fit snugly against the edges of the crib. If you can fit a soda can between the bars, they are too far apart and can allow the baby's head to caught between them.    Learn about infant care and feeding, including information about infant CPR. We recommend that you put your baby to sleep on his or her back to reduce the chance of Sudden Infant  Death Syndrome (SIDS). To maintain a healthy environment in which your child can grow, it's best to keep your home smoke-free. By preparing ahead, your transition into parenthood will go smoothly for you and your baby.    Your midwife or physician will want to see you for a checkup 2 to 6 weeks after delivery.    If you have questions about any symptoms you are experiencing or any other concerns, call your provider or their clinic staff at [facility] at Phone: [phone]. If it's after clinic hours, physician patients should call the [facility] at [phone] ([phone]); midwife patients should call their answering service at [phone].    How can you care for yourself at home?   You can refer to the Starting Out Right book or find it online at http://www.healthHexAirbot.org/images/stories/maternity/[facility]-Starting-Out-Right.pdf or http://www.[facility].org/images/stories/flipbooks/[facility]-starting-out-right/[facility]-starting-out-right.html#p=8     You can sign up for a weekly parenting e-mail that gives support, tips and advice from health care professionals that starts with pregnancy and continues through the toddler years. To register, go to www.[facility].org/baby at any time during your pregnancy.        Making Plans for Feeding My Baby    By this point, you probably have read a lot about feeding your baby.  Breastfeed or formula? Each mother s decision is her own and [facility] respects you and your choices. We ve gathered information on both breastfeeding and formula feeding to help with your decision. Talking with your physician or nurse-midwife can also help in your decision.  However you plan to feed your baby, [facility] encourage rooming in with your baby, skin-to-skin contact and feeding your baby based on his or her cues.    Skin-to-skin contact  Being close to mom helps your baby adjust to life outside of the womb.  It helps your baby regulate their body  temperature, heart rate, and breathing.  Your baby will usually be placed skin-to-skin immediately following birth or as soon as possible, if medical intervention is needed.    Rooming-In  Having your baby stay with you in your room is called  rooming-in .  Keeping your baby in your room helps you to learn how to care for your baby by getting to know your baby s cues, body rhythms and sleep cycle.       Cue-based feeding  Cues (signals) are baby s way of telling you what he or she wants.  When you learn your infant s cues, you know how to care for and feed your baby.   Feeding cues are the licking and smacking of lips, bringing their fist to their mouth, and a reflex called  rooting - where baby turns and opens his or her mouth, searching for the breast or bottle.  Crying is a late feeding cue.  Babies can feed frequently, often at least 8 times in 24 hours.    Breastfeeding facts  Breast milk is the best source of nutrition for your baby and is available at birth. In the first couple of days, your milk volume is already starting to increase, though it may not be noticeable. Breastfeed frequently to increase your milk supply. Within three to five days, you will begin to notice larger milk volumes. An increase in breast size, heaviness and firmness are often described as the milk  coming in.  Frequent breastfeeding can help breasts from getting overly firm and painful. You will know the baby is getting enough milk if your baby is having wet and dirty diapers and gaining weight.     If your goal is to exclusively breastfeed, it is important to not use any formula or artificial nipples (including bottles and pacifiers) while your baby is learning to breastfeed.  While it may seem like an  easy  option to give your baby a bottle, formula should only be given if there is a medical reason for your baby to have it.      Positioning and attachment   Get comfortable.  Use pillows as needed to support your arms and baby.   Hold baby close at the level of your breast, facing you in a tummy to tummy position.  Skin to skin helps with this.  Position the baby with his or her nose by the nipple.  There should be a straight line from baby s ear to shoulder to hips.  Tickle your baby s lips or wait for baby to open mouth wide, bring baby to breast by leading with the chin.  Aim the nipple at the roof of baby s mouth.  A rapid sucking pattern is followed by longer, drawing pattern with occasional swallows heard.  When baby is correctly latched, your nipple and much of the areola are pulled well into baby s mouth.      Returning to work or school  Focus on a good start to breastfeeding.  Many women continue to provide breastmilk for their baby when they return to work or school.  Making plans about where to pump and store milk can make the transition go well.  Talk with other mothers who have also returned to work or school for tips and support.  Your employer s Human Resource department may be a resource as well.     Returning to work or school: (continued)     babies can mean fewer  sick  days for you.    A quality breast pump will also save time and add comfort.  Check with your insurance prior to giving birth for breast pump coverage.  Many insurance companies include a pump within your benefits.    Wait until your baby is at least three weeks old to introduce a bottle for the first time.  Have someone besides you give the bottle.    Breastfeed when you are with your baby. Reserve your bottles of breast milk for when you are away.     Your breasts will need to be  emptied  either by your baby or a pump.  Plan to pump at least twice in an eight hour day.    If you cannot pump at work, continue breastfeeding at home. Any amount of breast milk is worth giving to your baby.    Formula feeding facts  If you are planning to use formula to feed your baby, you will want to make some preparations ahead of time. Talk to your doctor or  nurse-midwife about what type of formula to use. Some are iron-fortified, meaning they have extra iron in them. You will want to purchase formula and bottles before your baby is born to be sure you are ready after you return from the hospital. The [facility] do not provide formula samples to take home.    Be sure to follow formula mixing directions closely. Regular milk in the dairy case at the grocery store should not be given to babies under 1 year old. Baby formula is sold in several forms including:    Ready-to-use. This is the most expensive, but no mixing is necessary.    Concentrated liquid. This is less expensive than ready-to-use and you mix with water.    Powder. This is the least expensive. You mix one level scoop of powdered formula with two ounces of water and stir well.    Most babies need 2.5 ounces of formula per pound of body weight each day. This means an 8-pound baby may drink about 20 ounces of formula a day; however, this is just an estimate. The most important thing is to pay attention to your baby s cues.  If your baby is always fussy, needs more iron or has certain food allergies, your physician may suggest you change your baby s formula to a different kind.     How do I warm my baby s bottles?  You may feed your baby a bottle without warming it first. It is OK for the breast milk or formula to be cool or room temperature. If your baby seems to prefer it warmed, you can put the filled bottle in a container of warm water and let it stand for a few minutes. Check the temperature of the liquid on your skin before feeding it to your baby; to be sure it isn t too hot. Do not heat bottles in the microwave. Microwaves heat food and liquids unevenly, and this can cause hot spots that can burn your baby.    How do I clean and sterilize bottles?  Sterilize bottles and nipples before you use them for the first time. You can do this by putting them in boiling water for 5 minutes. After that  first time, you can wash them in hot and soapy water. Rinse them carefully to be sure there is no soap left on them. You can also wash them in the .    [facility] [phone] ([phone])

## 2021-05-30 NOTE — PROGRESS NOTES
"Barbara Aguirre Her is here by herself for a routine prenatal visit at 35w5d.  She has concerns about the \"rash\" on the back of her neck (see below)..    Fetal movement is normal. Interval weight gain is approriate.  Group B strep and hemoglobin discussed and obtained today.   Vertex confirmed via BSUS.  Encouraged to schedule weekly visits to/through her EDB.   Anticipatory guidance, signs of symptoms of labor or SROM, third trimester warning signs, when to call and CNM contact information reviewed.     S:  Barbara has a \"rash\" that started on  into 7/10 (during the night).  It is on the back of her neck and started out as red pinpoint bumps that had a \"stinging\" sensation.  Not itchy, no increase in bumps.  They are mostly on the right side of her neck with a few on her scalp - right side.  After sending a My Chart message on 7/10 - she was seen in [facility] where no definitive dx was made.  Was prescribed Benadryl cream and abx.  She states that the bumps are now \"crusted\" over and they are purplish in color.  She still has pain, that is \"throbbing\" on the right side of her head and neck, she is noted to be massaging the area throughout the visit.   She has a hx of chicken pox.  Reviewed Med, Surg, Family, and social hx.    O:    /62 (Patient Site: Right Arm, Patient Position: Sitting, Cuff Size: Adult Regular)   Pulse 84   Ht 4' 9\" (1.448 m)   Wt 146 lb (66.2 kg)   LMP 2018 (Exact Date)   Breastfeeding? No   BMI 31.59 kg/m    See Prenatal flowsheet  General:  NAD, pleasant  Neck:  Several clusters of vesicular rash noted on the back of neck - towards right side.  Purplish/dark color, crusted over.  No ocular involvement noted.      A:  27 yo  IUP @ 35.6 weeks  Likely shingles based on symptoms and physical exam    P:  Discussed with Dr. García and given clinical picture and my exam, shingles likely dx.  Reviewed UDT/CDC recommendation and treatment with Valtrex is best with onset of symptoms.  " Given Ka is one week out from symptoms and her rash is now crusted over, Valtrex/antivirals likely to have no significant effect at this point.   Reviewed comfort measures:  Heat, massage, acupuncture, chiropractic care, Tylenol.  Discussed that narcotics can be prescribed for severe pain, though would like to avoid in pregnancy.  Ka declines at this time.    Reviewed hygiene cares of excellent handwashing - avoiding any contact with others and open lesions.    Reassured that lesions crusting over are a good sign.    Discussed PHN as a potential side effect/complication.    Consider derm referral PRN.    OLIMPIA Diaz, CHRISTINE  7/17/2019  1:08 PM    Total time spent with patient 15 minutes, >50% counseling, education and coordination of care.

## 2021-05-30 NOTE — PROGRESS NOTES
"Barbara Aguirre Her is here by herself for a routine prenatal visit at 33w5d.  She has no concerns and is feeling well.    She did bring her birth plan - all very normal CNM practices.  I did clarify what occurs after delivery of placenta as she requests \"No pitocin or uterine massage.\"  I explained active management of third stage is recommended, but we can watch for bleeding if she desires.  She would like that first, but is ready to accept Pitocin if bleeding seems to be excessive.  I also discussed the importance/purpose of uterine/fundal massage.  Fetal movement is normal. Interval weight gain is approriate.   EPDS = 0 today.  No concerns about PPD; identifies a good support system.  Plans condoms for PPBC.  Reviewed Group B strep vaginal & rectal culture and hemoglobin at one of her upcoming visits between 35-37 weeks. Encouraged to schedule weekly visits from ~36 weeks to/through her EDB.     She would also like to schedule one prenatal visit with lactation.    Anticipatory guidance, warning signs (with emphasis on  labor signs and symptoms), when to call and CNM contact information reviewed.         "

## 2021-05-31 VITALS — BODY MASS INDEX: 28.69 KG/M2 | WEIGHT: 133 LBS | HEIGHT: 57 IN

## 2021-05-31 VITALS — WEIGHT: 142 LBS | BODY MASS INDEX: 30.63 KG/M2 | HEIGHT: 57 IN

## 2021-05-31 VITALS — HEIGHT: 57 IN | WEIGHT: 115.1 LBS | BODY MASS INDEX: 24.83 KG/M2

## 2021-05-31 VITALS — HEIGHT: 57 IN | WEIGHT: 131.3 LBS | BODY MASS INDEX: 28.33 KG/M2

## 2021-05-31 VITALS — HEIGHT: 57 IN | BODY MASS INDEX: 26.82 KG/M2 | WEIGHT: 124.3 LBS

## 2021-05-31 VITALS — BODY MASS INDEX: 24.4 KG/M2 | HEIGHT: 57 IN | WEIGHT: 113.1 LBS

## 2021-05-31 VITALS — HEIGHT: 57 IN | WEIGHT: 112 LBS | BODY MASS INDEX: 24.16 KG/M2

## 2021-05-31 VITALS — HEIGHT: 57 IN | BODY MASS INDEX: 29.56 KG/M2 | WEIGHT: 137 LBS

## 2021-05-31 VITALS — WEIGHT: 111.8 LBS | BODY MASS INDEX: 24.12 KG/M2 | HEIGHT: 57 IN

## 2021-05-31 NOTE — PROGRESS NOTES
"Barbara is here by herself for a routine PNV at 39w5d.  Doing well, waiting for baby - hoping baby comes soon.  Baby has been active.  No contractions, though Blount Ramirez ctx noted during exam.    Desires VE today.  Prior to VE discussed option of membrane sweep and R/B/A.  Barbara would like to do membrane sweep.  VE prior to sweep:  Tight 3cm/80/-1/med/mid.  Was able to sweep x3 passes, vertex palpated, membranes felt over fetal vertex.  However, when I removed my hand, drops of watery fluid mixed with scant bloody show was dripping from my glove.  Then when I looked at the perineum, there was a 6 inch x4 inch went spot on the pad.  After discussing likely ROM, and sending a ROM plus and discussing options for expectant management, Barbara then states \"well, I was going to ask, I have noticed some leaking since Sunday, but most in the mornings - watery fluid.\"  She states it felt \"warm\" after the exam/membrane sweep. I then changed the plan and asked for Barbara to present to the [facility] after gathering her things and calling her , etc - especially since she has potentially been ruptured x48 hours.    Discussed possibility of a high leak, especially since fetal membranes palpated over fetal vertex.  Reviewed potential options at the [facility] - rupture of forebag, or Pitocin.  Called [facility] Charge RN and ANNABELLA Butt CNM with report.  Barbara verbalizes understanding and knows to present to the [facility].  FHR checked prior to and after exam and in the range of 135-144.    "

## 2021-05-31 NOTE — TELEPHONE ENCOUNTER
TC: Calld Barbara Aguirre, 29 yo  at 1 week postpartum.    Telephone call: Barbara Aguirre Her delivered on 19 by spontaneous vaginal delivery.  Barbara Aguirre is happy with her birth. Her baby is breastfeeding well, but she would like to schedule a postpartum and lactation appt with Jinny Prieto since she was only able to pump to feed her daughter Barron.   Barbara Aguirre reports some baby blues but no depression or anxiety. Her 2nd degree lac is healing well  Transferred to scheduling to get in with Jinny Prieto CNM   .  OLIMPIA Stephen CNM

## 2021-05-31 NOTE — PROGRESS NOTES
Barbara Aguirre presents alone today  Feeling well, she is noticing occasional BH contractions, but nothing painful or regular. Reviewed when to call when in labor. Encouraged her to call when contractions are every 10 minutes to give the midwives a head's up with a plan for her to come in when contractions are every 5 minutes or so.   Last had a VE at 36 wks, she was 1-2/60%/0 station, declines VE today  Barbara is napping with Mart and trying to take care of herself  Barbara will get 12 weeks off once school starts, Ronald will get one week off.   Barbara's mother will come after Ronald goes back to work and stay for several weeks  Denies HA, visual changes, epigastric or RUQ pain

## 2021-06-01 VITALS — HEIGHT: 57 IN | WEIGHT: 141.7 LBS | BODY MASS INDEX: 30.57 KG/M2

## 2021-06-01 VITALS — HEIGHT: 57 IN | BODY MASS INDEX: 31.07 KG/M2 | WEIGHT: 144 LBS

## 2021-06-01 VITALS — HEIGHT: 57 IN | WEIGHT: 124 LBS | BODY MASS INDEX: 26.75 KG/M2

## 2021-06-01 VITALS — BODY MASS INDEX: 31.07 KG/M2 | WEIGHT: 144 LBS | HEIGHT: 57 IN

## 2021-06-01 NOTE — PATIENT INSTRUCTIONS - HE
Most women will have some separation of their abdominal muscles during pregnancy (diastasis recti).  Having good abdominal muscle tone can help reduce abdominal, pelvic, and back pain.  It can also help to prevent bloating and constipation.  Here are a couple videos to help repair your abdominal separation after pregnancy.    Diastasis Recti Postpartum    https://www.Epicrisisube.com/watch?v=SwwQWn6Qw34        Diastasis Recti Postpartum2    https://www.youCS Networksube.com/watch?v=nyluWbtdazA      POSTPARTUM INFORMATION AND RESOURCES:    Congratulations on the birth of your baby. We have gathered together some information on staying healthy in the postpartum time including information on exercise, nutrition and mental health. We have also listed some local and national resources for lactation support and mental health support.    If you have questions or concerns and need to speak with a midwife immediately, please call the on-call midwife at [phone].    If you have a non-emergent question or would like to schedule a follow up appointment, please call the clinic midwife at [phone].    Thank you for sharing your birth experience with the [facility].  Congratulations on the birth of your baby!    ---------------------------------------------------------------------------------------------------------  EXERCISE & NUTRITION:     Getting and Staying Active: A Healthy You!   -Why should I exercise? Exercise, being physically active, is very important for all women. Being active can help you:   Lose or maintain weight   Have more energy   Sleep better   Enjoy sex more   Relieve stress and think better   Lower the chance you will have heart disease, high blood pressure, and diabetes   Strengthen your bones and muscles   Have fewer hot flashes if you are older   -How active do I have to be to get the bene?ts of exercise?  Studies show that as little as 15 minutes of moderate exercise--like fast walking or dancing--3  times a week can improve the health of your heart. Ifyou want to get the best benefits from exercise, try to increase your physical activity to at least 30 minutes, 5 times a week. If you have a serious health problem,be sure to talk with your health care provider before starting an exercise program.     Taking Care of your Health: Health Care Maintenance Screening recommendations   In all the things women do, taking care of everything and everybody else, they sometimes forget to take care of themselves. This handout reviews the health screenings and vaccines that are recommended for women of all ages. Talk with yourhealth care provider about which tests and vaccines you need. The chart below lists the screening tests and vaccinations recommended for healthy women who do not have a high risk for most diseases.   The recommendations in thischart are guidelines only. For some tests and vaccines, the chart says you should talk to your health care provider.This is because the best recommendations for you depend on your personal health history. Your health care provider may suggest more frequent testing or additional tests ifyou havea higher chance of getting some diseases     Planning Your Family: Developing a Reproductive Life Plan   Planning ahead can help you avoid getting pregnant when you don t want to be pregnant and also be in good health if and when you do decide to become pregnant. Many women have at least one pregnancy in their lives, even if it was not planned. In fact, about half of all pregnancies are not planned. Getting pregnant when you did not plan it can be a problem, or it can turn into a happy event. Planning pregnancy leads to healthier pregnancies, healthier mothers, and healthier families.   Although many women want to have a family, not everyone wants to have children. More and more women are childless by choice (also known as childfree). Whether to have children is a personal choice that only  "you can make. It s okay not to want children! If you never want to get pregnant, it is important to make sure you always use very effective birth control, such as an intrauterine device, the birth control implant, female sterilization (having your tubes tied), or your partner having a vasectomy.     Reliable resources:   ?   American College of Nurse-Midwives (ACNM) http://www.midwife.org/; look at the informational handouts at http://www.midwife.org/Share-With-Women   ??   Women's Health.gov:   http://www.womenshealth.gov/a-z-topics/index.html   FDA - Nutrition ?www.mypyramid.gov? Under \"For Consumers,\" click on \"pregnant and breastfeeding women.\"   ?   Vaccines : Centers for Disease Control and Prevention (CDC) http://www.cdc.gov/vaccines/   ?   [facility] www.[employer].com   ?   When researching information on the web, question the validity of websites.? The domains .gov, Tappx and.org tend to be more reliable information.? If there are a lot of advertisements, be cautious of the information provided. Stay away from blogs and chat rooms please!   ?   ?   Nutrition and supplements:   Daily multivitamin vitamin (with 400 - 1000 mcg folic acid).? Take with food as needed.   ?   4-5 servings of dairy or other calcium rich foods (fish, leafy greens, soy)?per day - if not, take 500-1000 mg additional calcium (Tums, pills, chews). Take with dairy.   ?   Vitamin D3 4000 IU geltab daily. Vitamin D rich foods: Cod Liver Oil (1Tbsp), [place], Mackerel, Tuna, fortified milk and yogurt, Beef and liver, sardines, egg, fortified cereals, swiss cheese.? Take supplements with fattiest meal.?   ?   2-3 (4) oz servings of fish, seafood, nuts (walnuts & almonds), oils, avocado per week - if not, take Omega 3 Fatty acids: DHA & MICHEAL 6083-4207 mg per day. ?Other names: cod liver oil, fish oil. Take with fattiest meal.   ? " "  ?---------------------------------------------------------------------------------------------------------  POSTPARTUM & LACTATION RESOURCES :    Breastfeeding:   OUTPATIENT LACTATION RESOURCES   -Schedule an appointment with a [facility] CNBALAJI who is also a Lactation Consultant by calling [phone]. Raeann Lewis IBLADAN, CHRISTINE at [facility] on Wednesdays, Jinny Prieto CNM at [facility] on Tuesdays and [facility] on Thursdays.   [facility] Lactation Clinics located at [facility], [facility] and [facility]   Call: [phone].     Inpatient support     Outpatient appointments     Telephone consultation     Breast-feeding classes available through [employer]   ?     -[facility]:  www.Metooo   -Attend a baby weigh in at [facility]. Lactation consultants are available to answer questions   [place]: Tuesdays 1:00 - 2:00   [facility]: Mondays 1:00 - 2:00   -Attend a New Mamma group or a Second Time Mama group at [facility].     -[facility]: www."[employer]maSurf Air   -Attend one of the New Mama groups at [facility]. [facility] also offers one-on-one in home and in office lactation consults.     -[facility]: www.uriah.org/   -Attend a Devon League meeting. Multiple groups in several locations throughout the [facility]. The meetings are no-cost and always informative breastfeeding education session through [employer] La Leche League     - Information on medication use while breastfeeding: http://toxnet.nlm.nih.gov/newtoxnet/lactmed.htm     Other Online Breastfeeding Resources:     [facility].org/baby sign up for free online weekly e-mail     [facility].org/maternity     Breastfeedingmadesimple.com     Shirleneli.org (La Leche League)     Normalfed.com     Womenshealth.gov/breastfeeding     Workandpump.com     Pango.CityCiv    https://SkyWire.CityCiv/abcs/   Click on \"Learn More About " "Attachment\"    -New Parent Connection Drop-In:  In collaboration with [employer], Early Childhood Family Education (ECFE), a program of [address], offers ongoing classes for new parents in their infants.  The connection classes offer support and resources to parents during the exciting and challenging period of transition following the birth of her baby.  Parents and babies (birth to 6 months of age) may join the group at any time.  Baby's birth to 3 months meet , from 1230 to 1:30 PM  Babies 3-6 months meet , from 4 to 5 PM    -[employer] Group: www.Socialbomb/free-new-mama-group  -Attend [employer]. Groups located at three locations:  [facility] and [place]. Sign up online.       Additional Resources:?   -American College of Nurse-Midwives (ACNM) http://www.midwife.org/; look at the informational handouts at http://www.midwife.org/Share-With-Women  www.mymidwife.org   ?   -Women's Health.gov:   http://www.womenshealth.gov/a-z-topics/index.html   ?   -Early Childhood and Family Education (ECFE):   ECFE offers parents hands-on learning experiences that will nourish a lifetime of teachable moments.   http://ecfe.info/ecfe-home/       -----------------------------------------------------------------------------------------------------------   (Before, during and after pregnancy)   MOOD DISORDER RESOURCES :    Are you feeling sad or depressed?     Do you feel more irritable or angry with those around you?     Are you having difficulty bonding with your baby?     Do you feel anxious or panicky?     Are you having problems with eating or sleeping?     Are you having upsetting thoughts that you can t get out of your mind?     Do you feel as if you are  out of control  or  going crazy ?     Do you feel like you never should have become a mother?     Are you worried that you might hurt your baby or yourself?    Any of these symptoms, and " many more, could indicate that you have a form of  mood or anxiety disorder, such as postpartum depression. While many women experience some mild mood changes during or after the birth of a child, 15 to 20% of women experience more significant symptoms of depression or anxiety. Please know that with informed care you can prevent a worsening of these symptoms and can fully recover. There is no reason to continue to suffer.  Women of every culture, age, income level and race can develop  mood and anxiety disorders. Symptoms can appear any time during pregnancy and the first 12 months after childbirth. There are effective and well-researched treatment options to help you recover. Although the term  postpartum depression  is most often used, there are actually several forms of illness that women may experience.    Resources:    -Pregnancy and Postpartum Support [place]: www.[facility].org  Who We Are: We are a group of mental health &  practitioners, service organizations, and mother volunteers who provides services to those struggling with a pregnancy, loss, or postpartum mood disorder through the Helpline, professional training, our resource list and website.  What We Do: We provide support, advocacy, awareness, and training about  mental health in [place].     Community Resource List:   This is a list of  resources within our community.   http://[facility] Northlandupportmn.org/communityresourcelist    PSYCHOTHERAPISTS/CONSULTANTS   This is a list of licensed mental health professionals who have advanced clinical skills in the treatment of postpartum mood and anxiety disorders (PMADs).   Http://ppsupportmn.org/mentalhealthproviderresourcelist   INTEGRATIVE MEDICINE PRACTITIONERS:   This is a list of licensed providers who practice Integrative Medicine: a form of treatment that combines alternative medicine with evidence based medicine in an effort to treat the  whole person   "(the person, not just the disease).   http://[facility].org/integrativemedicineproviders    PSYCHIATRIC CARE   This is a list of licensed Psychiatrists who have advanced clinical skills in the treatment and medication management for PMADs and lactating mothers.   Http://[facility].org/psychiatryproviderresroucelist   Click on the links below to find detailed profiles and contact information of providers who have been screened and approved as having advanced training in the area of PMADs. Please note: [facility] does not endorse a specific provider.   The list is in alphabetical order by city. You can also search for providers by city or zip code using the search box. Please click on the \"Show\" box to the upper right to advance to the next page. You may also call our Helpline at [phone] if you would like for our Helpline volunteer to find a provider for you.    -Postpartum Depression Support Group:   Weekly groups at no cost through [facility], , 1:30-3:00 p.m., at [facility], [address] [place] [address], [address]. [place], , 1:30-3:00 p.m., at [facility], [address] To register for the group or get more information, call [phone].   -Postpartum Depression Support Group:   Outpatient Intensive Treatment program for women with  mood disorders [facility] Mother/Baby Program     -[facility]  Resource Guide     Women s Mental Health at [facility]  This website provides a range of current information including discussion of new research findings in women s mental health and how such investigations inform day-to-day clinical practice. Despite the growing number of studies being conducted in women s health, the clinical implications of such work are frequently controversial, leaving patients with questions regarding the most appropriate path to follow. Providing " these resources to patients and their doctors so that individual clinical decisions can be made in a thoughtful and collaborative fashion dovetails with the mission of our Center.    https://womensmentalhealth.org/      If you re having thoughts of harming yourself or your baby, it is vital to get support immediately. Call 911 or go to the nearest E.R.     TOLL-FREE / NATIONWIDE EMERGENCY ASSISTANCE   Immediate Emergency:  911   National Suicide Prevention Lifeline:   2-946-366-4725   Suicide Prevention Hotline:   0-319-TIYKGRC   [place] Postpartum Depression Hotline:   1-800-PPD-MOMS   Postpartum Support International (PSI)   PPD Helpline: (not a 24-hour hotline)   1-776.102.8268

## 2021-06-01 NOTE — PROGRESS NOTES
NIKHIL@Patient ID: Barbara Walsh is a 29 y.o. female.  Assessment:   Normal postpartum exam at ~6 weeks postpartum.   lactating    Plan:    1. Pap smear not done at today's visit. Due for annual Gyn exam in September 2020   2. Desired contraception:  Unsure - though discussed multiple options.  Likely to go with either Mirena or Paragard.  Reviewed R/B/A of both and normal bleeding profile side effects.  Reviewed risks of uterine perforation, infection, and bleeding.  Recommended no IC, or protection at least two weeks prior to insertion.  Pregnancy test and GC/CT to be done with insertion.    3. Discussed resumption of exercise and setting a goal to return to pre-pregnancy weight in the next 6-12 months.   4.  Resumption of intercourse reviewed with possible changes in libido and vaginal lubrication while nursing.  5.  Nutrition and supplements reviewed.  Advised continuation of a prenatal or multivitamin, also Vitamin D3 5000 IU geltab daily and an omega 3 fatty acid supplement.  6. Adjustment to parenting, self care and open communication with her support system discussed. Warning signs and symptoms related to postpartum mood disorders reviewed.     Total time spent with patient 40 minutes, >50% counseling, education and coordination of care.    Subjective:     Barbara Walsh is a 29 y.o. female who presents for postpartum visit. She is 6 weeks postpartum following a NSVB.  I have fully reviewed the prenatal and intrapartum course. The delivery was at Term and 39.5 weeks gestation Her baby boy is named Hung and weighed 7 lbs 11 oz at birth.     Postpartum course has been stable. Baby's course has been stable. Baby is feeding breast.  Lochia ceased at 3 weeks postpartum.  Bowel function is normal. Bladder function is normal. She has not resumed intercourse. Desired contraception: unsure - see discussion above. [place] postpartum depression screening score: 0. She has not resumed regular exercise. Patient  returns to work in 8 weeks.    Review of Systems  General:  Denies problem  Eyes: Denies problem  Ears/Nose/Throat: Denies problem  Cardiovascular: Denies problem  Respiratory:  Denies problem  Gastrointestinal:  Denies problem,   Genitourinary: Denies problem  Musculoskeletal:  Denies problem  Skin: Denies problem  Neurologic: Denies problem  Psychiatric: Denies Problem  Endocrine: Denies problem    Objective:         Physical Exam:  General Appearance: Alert, cooperative, no distress, appears stated age  Skin: Skin color, texture, turgor normal, no rashes or lesions  Throat: Lips, mucosa, and tongue normal; teeth and gums normal  HEENT: grossly normal; otoscopic and opthalmic exam not performed.   Neck: Supple, symmetrical, trachea midline, no adenopathy;  thyroid: not enlarged, symmetric, no tenderness/mass/nodules  Lungs: Clear to auscultation bilaterally, respirations unlabored  Breasts: No breast masses, tenderness, asymmetry, or nipple discharge.  Heart: Regular rate and rhythm, S1 and S2 normal, no murmur, rub, or gallop  Abdomen: Soft, non-tender.   Pelvic:External genitalia normal without lesions or irritation. Vagina and cervix show no lesions, inflammation, discharge or tenderness. 2nd degree perineal laceration well approximated and well healed.   No cystocele, No rectocele. Uterus fully involuted.  No adnexal mass or tenderness.  Extremities: Extremities normal without varicosities or edema       Last Pap: 2016. Results were: normal  Immunization History   Administered Date(s) Administered     DTaP, historic 04/12/1990, 04/13/1990, 11/29/1990, 11/08/1993, 06/16/1994     Hep A, Adult IM (19yr & older) 03/25/2011     Hep B, historic 08/16/1994, 06/26/1995, 11/13/1995, 01/28/2002     HiB, historic,unspecified 08/08/1991     IPV 04/12/1990, 04/13/1990, 11/29/1990, 11/08/1993, 08/16/1994     Influenza, inj, historic,unspecified 10/28/2003, 10/30/2014, 10/15/2018     Influenza,seasonal quad, PF, =/> 6months  12/07/2016, 09/25/2017     Influenza,seasonal, Inj IIV3 10/29/2013     MMR 11/08/1993, 05/23/2002, 08/14/2019     Td,adult,historic,unspecified 07/27/2004     Tdap 12/04/2017, 05/23/2019     Immunization status: up to date and documented

## 2021-06-02 VITALS — HEIGHT: 57 IN | BODY MASS INDEX: 25.03 KG/M2 | WEIGHT: 116 LBS

## 2021-06-02 VITALS — BODY MASS INDEX: 25.32 KG/M2 | WEIGHT: 117 LBS

## 2021-06-02 VITALS — BODY MASS INDEX: 25.03 KG/M2 | HEIGHT: 57 IN | WEIGHT: 116 LBS

## 2021-06-02 VITALS — BODY MASS INDEX: 26.32 KG/M2 | HEIGHT: 57 IN | WEIGHT: 122 LBS

## 2021-06-03 VITALS — WEIGHT: 140 LBS | BODY MASS INDEX: 30.2 KG/M2 | HEIGHT: 57 IN

## 2021-06-03 VITALS
HEIGHT: 57 IN | DIASTOLIC BLOOD PRESSURE: 64 MMHG | BODY MASS INDEX: 28.26 KG/M2 | HEART RATE: 72 BPM | SYSTOLIC BLOOD PRESSURE: 102 MMHG | WEIGHT: 131 LBS

## 2021-06-03 VITALS — HEIGHT: 57 IN | BODY MASS INDEX: 30.42 KG/M2 | WEIGHT: 141 LBS

## 2021-06-03 VITALS — WEIGHT: 135 LBS | HEIGHT: 57 IN | BODY MASS INDEX: 29.12 KG/M2

## 2021-06-03 VITALS — BODY MASS INDEX: 31.5 KG/M2 | WEIGHT: 146 LBS | HEIGHT: 57 IN

## 2021-06-03 VITALS — HEIGHT: 57 IN | WEIGHT: 150 LBS | BODY MASS INDEX: 32.36 KG/M2

## 2021-06-03 VITALS — HEIGHT: 57 IN | BODY MASS INDEX: 32.15 KG/M2 | WEIGHT: 149 LBS

## 2021-06-03 VITALS — WEIGHT: 132 LBS | BODY MASS INDEX: 28.56 KG/M2

## 2021-06-03 VITALS — WEIGHT: 141 LBS | BODY MASS INDEX: 30.51 KG/M2

## 2021-06-03 VITALS — HEIGHT: 57 IN | WEIGHT: 141.6 LBS | BODY MASS INDEX: 30.55 KG/M2

## 2021-06-03 VITALS — WEIGHT: 147 LBS | BODY MASS INDEX: 31.81 KG/M2

## 2021-06-03 VITALS — BODY MASS INDEX: 32.1 KG/M2 | HEIGHT: 57 IN | WEIGHT: 148.8 LBS

## 2021-06-03 VITALS — BODY MASS INDEX: 31.5 KG/M2 | WEIGHT: 145.56 LBS

## 2021-06-11 NOTE — PROGRESS NOTES
Assessment/Plan:         1. Late period  Pregnancy, Urine   2. Less than 8 weeks gestation of pregnancy       I congratulated the patient on her positive pregnancy test.  Based on her LMP she is approximately 5 weeks 4 days pregnant with an expected due date of 2/25/17. She was advised to continue with her prenatal vitamin with minimum 400  g of folic acid. This may be taken at night to decrease nausea if this occurs when taking the prenatal vitamin. Discussed early recommendations in pregnancy and gave her [facility] pregnancy folder.     She was advised to schedule her first OB with the [facility] nurse midwives between 10 and 12 weeks which will be between 7/30 and 8/13. She was advised to contact myself via Ariesot if she has any questions.  Advised patient to report immediately if she experiences any vaginal bleeding.       Subjective:      Barbara Walsh is a 26 y.o. female who presents for pregnancy confirmation. LMP was 5/21/2017. She took 2 pregnancy tests at home which were positive. Her average cycle length is 31-32 days, her periods have been regular. She had a miscarriage in December and is feeling anxious about this pregnancy so far.  She found out she had experienced a miscarriage at approximately 11 weeks pregnant however upon review of the ultrasound the fetus had stopped growing at 6 weeks.  So far with this pregnancy she has been feeling well.  She has had sore breasts as well as feeling mildly nauseous and fatigued.  She has been taking a prenatal vitamin on a regular basis and so far has been tolerating this well.  She has no significant personal medical history surgical history or any significant family history.  This will be a second pregnancy for this patient.    The following portions of the patient's history were reviewed and updated as appropriate: allergies, current medications, past family history, past medical history, past social history, past surgical history and problem  "list.    History reviewed. No pertinent past medical history.  History reviewed. No pertinent surgical history.  Social History     Social History     Marital status:      Spouse name: N/A     Number of children: N/A     Years of education: N/A     Occupational History     Not on file.     Social History Main Topics     Smoking status: Never Smoker     Smokeless tobacco: Never Used     Alcohol use No     Drug use: No     Sexual activity: Yes     Partners: Male      Comment:  Ronald Nino     Other Topics Concern     Not on file     Social History Narrative     Family History   Problem Relation Age of Onset     No Medical Problems Mother      No Medical Problems Father      Breast cancer Neg Hx      Cancer Neg Hx      Colon cancer Neg Hx      Diabetes Neg Hx      Heart disease Neg Hx        Current Outpatient Prescriptions:      prenatal #115-iron-folic acid 29 mg iron- 1 mg Chew, Chew daily., Disp: , Rfl:     Review of Systems   Pertinent items are noted in HPI.      Objective:      BP 98/58 (Patient Site: Right Arm, Patient Position: Sitting, Cuff Size: Adult Regular)  Pulse 64  Ht 4' 9\" (1.448 m)  Wt 113 lb 1.6 oz (51.3 kg)  LMP 05/21/2017 (Exact Date)  Breastfeeding? No  BMI 24.47 kg/m2    General appearance: alert, appears stated age and cooperative   Given the nature of the visit no further examination was completed today.  Results for orders placed or performed in visit on 06/29/17   Pregnancy, Urine   Result Value Ref Range    Pregnancy Test, Urine Positive (!) Negative       "

## 2021-06-12 NOTE — PROGRESS NOTES
Barbara Aguirre Her presents with her  today. Reports some HA or migraines-- often a few days in a row, usually at the end of the day, thought it was related to prenatal vitamin. Occasional HA prior to pregnancy, but not like this. Usually at the top of head, taking tylenol which is helpful. Feels like drinks a lot of water, though unable to quantify. States she is snacking frequently.  We reviewed her weight loss, and discussed that it is possible she is not taking in enough calories as well as fluids.  Encouraged a food journal for 2 days, 1 week day and one weekend, to review areas where she could improve .Referral sent for 20 week .  Patient will self-schedule. Discussed how these results will be disseminated with patient.  Patient is having some round ligament pain, we discussed normal pregnancy discomforts and ways of managing them.  Warning signs reviewed with patient.

## 2021-06-12 NOTE — PROGRESS NOTES
PRENATAL VISIT   FIRST OBSTETRICAL EXAM - OB    Assessment / Impression     First prenatal visit at 10w1d        Plan:       -IOB labs drawn. Pap due 2019.  -No indication for drawing lead level.  -Patient is not interested in waterbirth. Hep C not drawn today.  -Reviewed prenatal care schedule and discussed routine OB visits versus problem visits and referrals. Also discussed use of ultrasound in pregnancy for dating, 20 week anatomy scan and PRN if pregnancy warrants. Patient declines dating US.  -Optimal nutrition and weight gain discussed. Pregnancy weight gain of 25-35 lbs (BMI 18.5-24.9) encouraged.   -Reviewed importance of regular exercise in pregnancy and help with low back pain and other pregnancy symptoms.   -Discussed importance of taking aprenatal vitamin with the goal of having 400 mcg of folic acid. Additionally taking a Vitamin D supplement (1,000-2,000 IU/day) and an Omega 3/fish oil/DHA is beneficial. -Anticipatory guidance for common pregnancy questions and concerns reviewed.   -Danger s/sx for this trimester reviewed with patient.  -Reviewed genetic aneuploidy screening options. Patient declines genetic testing.   -The following referrals were given to patient for follow up: NA  -IOB packet given and reviewed with patient, discussed standards of care, scope of practice, clinic and hospital settings, also reviewed clinic versus emergency phone number.   -Discussed baby friend hospitals, policies, and recommendations regarding breastfeeding as well as professional and community support.  -[facility] services and hospital options reviewed; emergency and scheduling phone numbers given to patient.  -Return to clinic 4-6 weeks.     Total time spent with patient: 45 minutes, >50% time spent counseling and coordinating care.    Subjective:    Barbara Aguirre Her is a 26 y.o.  here today for her First Obstetrical Exam.  Accompanied by her , Pebbles. They are both very excited but anxious due to history  "of SAB. Barbara Aguirre reports feeling well, no bleeding, though occasional irregular cramping. She is also experiencing some nausea and vomiting, mostly at night, but this has improved significantly. Some food aversion.       Exercise: Runs and walks, mostly walking 2-3 times a week.   Safety (seatbelt, gun access, IPV, hx abuse): yes/no/no/no  Tobacco/Alcohol/Drug Use: no/no/no  EPDS today: 5  Sexual Partners: 1  Last Breast Exam: 2016    Education level: College Graduate  Occupation: Teacher  Partners name: Pebbles  Partners occupation: [employer] (in school system)    OB History    Para Term  AB Living   2 0 0 0 1 0   SAB TAB Ectopic Multiple Live Births   0 0 0 0       # Outcome Date GA Lbr Dariel/2nd Weight Sex Delivery Anes PTL Lv   2 Current            1 AB                   Expected Date of Delivery: 2018, Alternate YAMILET Entry    Past Medical History:   Diagnosis Date     Varicella      History reviewed. No pertinent surgical history.  Social History   Substance Use Topics     Smoking status: Never Smoker     Smokeless tobacco: Never Used     Alcohol use No     Current Outpatient Prescriptions   Medication Sig Dispense Refill     prenatal #115-iron-folic acid 29 mg iron- 1 mg Chew Chew daily.       No current facility-administered medications for this visit.      No Known Allergies          Pregnancy Risk Factors: None      Review of Systems  General:  Denies problem  Eyes: Denies problem  Ears/Nose/Throat: Denies problem  Cardiovascular: Denies problem  Respiratory:  Denies problem  Gastrointestinal:  Denies problem  Genitourinary: Denies problem  Musculoskeletal:  Denies problem  Skin: Denies problem  Neurologic: Denies problem  Psychiatric: Denies problem  Endocrine: Denies problem  Heme/Lymphatic: Denies problem   Allergic/Immunologic: Denies problem       Objective:   Objective    Vitals:    17 1012   BP: 108/60   Pulse: 72   Weight: 112 lb (50.8 kg)   Height: 4' 9\" (1.448 m)     Physical " Exam:  General Appearance: Alert, cooperative, no distress, appears stated age  Head: Normocephalic, without obvious abnormality, atraumatic  Eyes: Conjunctiva/corneas clear, does not wear corrective lenses  Neck: Supple, symmetrical, trachea midline, no adenopathy  Thyroid: not enlarged, symmetric, no tenderness/mass/nodules  Back: Symmetric, no curvature, ROM normal, no CVA tenderness  Lungs: Clear to auscultation bilaterally, respirations unlabored  Heart: Regular rate and rhythm, S1 and S2 normal, no murmur, rub, or gallop  Breasts: Deferred  Abdomen: Soft, non-tender, bowel sounds active all four quadrants, no masses.   FHT: [place] >160s, doppler unable to read.  Vulva:  no sign of lesions or condyloma, hair removed  Vagina: pink, normal rugae, no abnormal discharge  Cervix: LTC per bimanual, non-tender, negative CMT  Uterus: retroverted position, non tender, enlarged approximately 10 weeks size  Adnexa:  no masses appreciated, non-tender with palpation  Pelvic spines:AVERAGE  Sacrum:STRAIGHT  Suprapubic Arch:NARROW          Extremities: Extremities normal, atraumatic, no cyanosis or edema  Skin: Skin color, texture, turgor normal, no rashes or lesions  Lymph nodes: Cervical, supraclavicular, and axillary nodes normal  Neurologic: Alert and oriented x 3.

## 2021-06-13 NOTE — PROGRESS NOTES
Barbara Aguirre presents to the clinic with her  Karol.  All is well!  Very much enjoying most the growth of the baby and her tummy.  Reports quickening!  Denies continued nausea or vomiting, vaginal bleeding, loss of fluid or lower abdominal pain.  Patient concerned about first trimester nausea of pregnancy affecting total weight gain.  She does not have a food journal to review today.  This writer reassured patient that fundal height measurements are increasing, and it is reassuring that nausea has abated and more regular eating has resumed.  Total weight gain this pregnancy: 2 pounds.  Declines second trimester genetic screening.  20 week fetal anatomy survey ultrasound is scheduled for October 10, 2017.  Patient is excepting of influenza vaccination today.  Danger signs and symptoms reviewed.  All questions answered.  Encouraged to call or return to clinic with any questions, concerns, or as needed.

## 2021-06-13 NOTE — PROGRESS NOTES
Barbara Aguirre Her presents with her  for her 20 weeks visit. Reviewed the 20-week US WDL, questions answered regarding fetal presentation. Discussed idea of childbirth education classes.  Encouraged to consider signing up for some. They are considering taking child CPR, a breast-feeding class and may be interested in lamaze. Reviewed warning signs and expectations for this trimester, all questions answered.

## 2021-06-14 NOTE — PROGRESS NOTES
Barbara Aguirre and Brit are here for prenatal visit today.  Feeling well.  Baby active, no contractions, bleeding or leaking.  Having some reflux--discussed comfort measures.  No swelling other than just a trace in hands.  Plans to use [facility] for pediatric care.  GCT, RPR and HGB done today.  Taking baby CPR class this weekend;  Planning to do labor prep class but still looking for one that fits schedule.  Discussed that [facility], [employer] [facility] all have options for classes to fit busy schedules.  Is not planning any upcoming travel, but explained travel is OK up to 36 weeks.  Reviewed BH contractions and warning signs PTL.  Works as teacher--discussed handwashing for infection prevention.  TDap given today.  Reviewed upcoming schedule of visits.

## 2021-06-14 NOTE — PROGRESS NOTES
"S: 27yo  @ 26w1d Routine OB visit  1.  Patient verbalizes experiencing Cold symptoms and unsure of what she can take so she has not tried anything excluding lemon drops to help with the sinus drainage.  2.  Patient states that the baby is very active.  Denies vaginal bleeding, leakage of fluid, CTX at this time.  3.  She states that her and her spouse have breastfeeding and infant and  CPR courses scheduled for Dec and they learned it is covered by their insurance.  They are still researching childbirth education options.  4.  She declines any other questions or concerns at this time.    O: BP 98/60  Pulse 88  Ht 4' 9\" (1.448 m)  Wt 131 lb 4.8 oz (59.6 kg)  LMP 2017 (Exact Date)  Breastfeeding? No  BMI 28.41 kg/m2  Vital signs within normal limits  Interval weight gain appropriate   Fundal height 26cm  Fetal heart Rate 149 via doppler  Fetal presentation undeterminable    A: 26yo  IUP@26w1d  Supervision of routine pregnancy   Counseling and education provided regarding safe medication during pregnancy    P:  1.  Discussed and educated patient on medications safe in pregnancy and referred her to the safe during pregnancy handout given at intake OB appointment and additionally covered what not to use such as medicated nasal sprays.  Other decongestants are safe along with other cough and cold meds.  Emphasized the importance of hydration even more so during time of illness to assist with sinus drainage and prevention of dehydration.  2.  Discussed PTL precautions and other S/S to contact the Midwife line, additionally covered other non-emergent issues.  Confirmed patient has contact numbers for emergent and non-emergent concerns.  3.  Reviewed and educated patient on 28 week visit.  Including GCT, HGB, RPR rationale and procedures to include possible outcomes.  Encouraged patient to consider Tdap vaccine at next appointment and discussed CDC recommendations for this vaccine.  4. " Encouraged patient to start thinking of developing a birth plan as her and her partner attend the educational course they have schedule and will schedule.  5. Encouraged patient to schedule next routine OB visit for 2 weeks.       Patient was seen with student, SARA Mcbride who was present for learning. I personally assessed, examined and made clinical decisions reflected in the documentation. OLIMPIA Snow,CNM

## 2021-06-15 NOTE — PROGRESS NOTES
Barbara Beckylaura Her is here with her spouse for a routine prenatal visit at 35w1d.  She has no concerns and is feeling well.    Fetal movement is normal. Interval weight gain is approriate. Group B strep and hemoglobin discussed and plan to obtain in 1 week.. Encouraged to schedule weekly visits to/through her EDB.   Anticipatory guidance, signs of symptoms of labor or SROM, third trimester warning signs, when to call and CNM contact information reviewed.  Reviewed birth plan all looks well.  Reviewed signs of active labor discussed safety staying home in early labor.  PDS today 6.  Reports some swelling in her hands that makes her hands little bit stiff when she wakes up in the morning but denies any other signs and symptoms of pregnancy-induced hypertension/preeclampsia.  Questions answered plan to return to clinic in 1 week.

## 2021-06-15 NOTE — PROGRESS NOTES
Patient arrived ambulatory to [facility] at 0715 with complaints of 2 large gushes of fluid that occurred around 0530. Patient to room 31, vital signs obtained and stable. ROM+ collected and SVE done due to patient luisito palpating mild.SVE as noted in flowsheet. Christy Villafana CNM on unit at 0800, and updated on ROM + and SVE. ROM+ result positive and patient would like to leave hospital for a little while. Christy KING into room to speak with patient. Will await orders.  Ale Antunez RN  2/9/2018 8:35 AM

## 2021-06-15 NOTE — DISCHARGE SUMMARY
OB Discharge Summary      Date:  2018    Name:  Barbara Aguirre Her  :  1990  MRN:  [id number]      Admission Date:  2018  Delivery Date:  This patient has no babies on file.  Gestational Age at Delivery:  37w5d  Discharge Date:  2018    Principal Diagnosis:    Patient Active Problem List   Diagnosis     Environmental allergies     Supervision of normal first pregnancy     Anemia affecting pregnancy in second trimester       Other Diagnosis:      Conditions complicating Pregnancy:  PROM    Procedure(s) Performed:  NST    Condition at Discharge:  Stable    Discharge Medications:    Her, Barbara Aguirre   Home Medication Instructions REGINA:[id number]    Printed on:18 0850   Medication Information                      ferrous sulfate SR (SLOW FE) 142 mg (45 mg iron) TbER  Take by mouth.             prenatal #115-iron-folic acid 29 mg iron- 1 mg Chew  Chew daily.                 Discharge Plan:    NST tonight   Plan for IOL at 24 hours (6 AM 2/10/2018)              Outpatient/Triage Note:    Patient Name:  Barbara Aguirre Her  :      1990  MRN:      [id number]      Assessment:   @ 37w5d here for evaluation of ROM.   PROM  Afebrile  1 SVE performed since PROM    GBS Negative  Reactive NST    Plan:   1. Reviewed ACOG, ACNM and Term PROM study. We discussed use of Pitocin for immediate induction of labor. We reviewed the risks and benefits of expectant management versus induction of labor and patient desires expectant management at this time. We reviewed increased risk of infection with prolonged ROM as well as importance of limiting SVEs to decrease risk of infection. We reviewed the sequale that occurs with Triple I and our goal is to avoid this. We discussed statistics that support most individuals will begin laboring within 24 hours. Questions answered and patient feels supported in decision to return home.  2. Recommend: Nothing in the vagina, monitoring temperature every 3 hours, calling CNM with changes  "to fetal movement, chills, fever, or change to fluid color. Also recommend repeat NST tonight, patient will call on-call CNM to plan this this evening if not in active labor.   3. Recommend IOL by 24 hours, patient likely accepting and agrees to present to [facility] at 6 AM 2/10/2018 if not in active labor, if laboring plans to still call CNM in the morning.   4. Discharge to home, no further questions or concerns, will call with any warning signs as mentioned above or in active labor.     Subjective:  Barbara Walsh is a 27 y.o.  at 37 5/7 weeks, with an EDC of 2018 who presented to [facility] for evaluation of possible PROM. Patient reports that around 5:45 or 6 am she woke up having drenched the bed, she went to the bathroom and had to change her underpants twice, she states that as she changes positions she continues to have leaking of fluid. The fluid is clear. Fetus is active. She denies noting any contractions.                 -Contractions: absent per report                        - LOF: present                        - Bleeding: denies                        - HA: denies                        - Dysuria/hematuria: denies                        - Swelling of the hands/face/feet: denies                        - Fetal movement: no changes    Objective:  Vital signs: /76  Pulse 83  Temp 98  F (36.7  C) (Oral)   Resp 16  Ht 4' 9\" (1.448 m)  Wt 144 lb (65.3 kg)  LMP 2017 (Exact Date)  BMI 31.16 kg/m2  FHR: 140 BPM with moderate variability and accelerations.   Uterine contractions: every 4-6 minutes lasting 50-60 seconds, mild, patient unaware.    Physical Exam:   Abdomen: SIUP, vertex by Leopold's, abdomen non-tender  SVE: FT/70/-2, performed by RN  Legs: no edema  VERTEX confirmed by BSUS      Results:    Lab:   Results for orders placed or performed during the hospital encounter of 18   Rupture of Membrane Test or Screen   Result Value Ref Range    Rupture Fetal Membrane Positive (!) " Negative          Provider:OLIMPIA Josue, CNM  [facility]      TT with patient 15 mn >50% time spent counseling, education and care coordination.

## 2021-06-15 NOTE — PROGRESS NOTES
Barbara Aguirre Her presents with  today.  Reviewed birth-plan. Encouraged to bring to visit if desires to write.  We discussed that with the visit returning in 2 weeks we may or may not do group B strep at that time and a cervical exam, may recommend returning 1 week later for these lab tests.  Patient states that she may have had some [place] contractions earlier today, she endorses normal fetal movement and she denies any vaginal bleeding or leaking of fluid.  We reviewed signs and symptoms of  labor, we also discussed signs and symptoms of PIH/gestational hypertension.  We reviewed baby friendly [facility] and I encouraged the patient to write on her birth plan that she desires to exclusively breast-feed and does not desire any formula while in the hospital.  Most of 32 week checklist was filled out and we did discuss child spacing and postpartum contraception- encourage patient and spouse to review further.  Patient began taking Slow Fe 1 time a day she denies any constipation we will recheck her hemoglobin and her 36 week visit.  Warning signs reviewed and plans return to clinic in 2 weeks.

## 2021-06-15 NOTE — PROGRESS NOTES
"S: 28yo  @ 37wk1d here for routine OB visit  1.  Patient declines vaginal bleed and CTX at this time.  2.  She reports experiencing two gushes of fluid this AM (0900 &1100) when going to the bathroom.  Stated \"I went to the restroom and as soon as I sat down I experienced a gush of fluid and when it stopped I still peed\"  She does not know the appearance of it but did not see any blood in the toilet.  The experience was the same for both times.  3.  Patient declines any other questions or concerns at this time. Patient has to leave clinic for Parent/Teacher conferences.     O: /60  Pulse 80  Ht 4' 9\" (1.448 m)  Wt 144 lb (65.3 kg)  LMP 2017 (Exact Date)  Breastfeeding? No  BMI 31.16 kg/m2  Vital signs within normal limits and appropriate interval weight gain.  Fundal height: 38  Fetal Presentation: Vertex  Fetal Heart Rate: 136  Sterile Speculum Exam: Negative valsalva, fetal head low in pelvis noted as placing sterile speculum, no pooling noted though patient not seated for long period of time.   Fern: Negative    P:  1.  Discussed and reviewed S/S of labor and when to call the midwife line for further evaluation.  Confirmed patient has correct contact numbers.   2.  Reviewed S/S of GHTN and Pre-E including visual changes, HA not relieved by tylenol, hydration, or nutrition, and sharp RUQ pain, including when to call for further evaluation.  3.  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 I recommend she call on-call CNM to discuss. Patient agrees to call.     Patient was seen with student, ASRA Mcbride who was present for learning. I personally assessed, examined and made clinical decisions reflected in the documentation. OLIMPIA Snow,CHRISTINE    "

## 2021-06-15 NOTE — PROGRESS NOTES
"S: 26yo  @ 36wk1d here for routine OB  1.  Patient declines vaginal bleeding, leakage/gush of fluids, or CTX  2. Patient state that the baby is very active, confirming adequate fetal movement.  3.  She states she has been experiencing reflux in the evening and is currently using Tums for relief but does not like the taste.  4.  Patient also reports some swelling in hands bilaterally in the morning upon waking with some stiffness.    O: /60  Pulse 78  Ht 4' 9\" (1.448 m)  Wt 141 lb 11.2 oz (64.3 kg)  LMP 2017 (Exact Date)  Breastfeeding? No  BMI 30.66 kg/m2  Vital signs within normal limits and appropriate weight gain interval.  Fundal height 38cm  Fetal Presentation vertex via Leopold's and exam  Fetal Heart Rate: 136 via Doppler  Cervix: Closed/70%/posterior/soft/fetal head at 0 station.  This writer is easily able to palpate sutures to the vaginal wall.  In addition patient reports hiccups down low and kicks in the upper right quadrant.    A: 26yo @36wk1d  Supervision of routine pregnancy in third trimester     P:  1.  Discussed S/S of PTL and when she should call for further evaluation.  Confirmed patient has contact numbers for emergent and non-emergent concerns.  2.  Discussed potential interventions to assist in decreasing the occurrence or intensity of reflux to include decreasing the size of meals and eating more frequently, the use of papaya extract and positioning when sleeping.   3.  Educated patient on proper hydration and nutrition.  Provided possible explanation for swelling and comfort measure such as placement during bed and taking breaks during long periods of writing, typing, and other activities requiring prolonged hand position.   4.  Ordered and Consented patient for GBS and Hgb screening labs.  Labs completed.  5.  Educated patient on plan of care going forward with regards to weekly appointments and to schedule her next routine OB in one week.    Patient was seen " with student, SARA Mcbride who was present for learning. I personally assessed, examined and made clinical decisions reflected in the documentation. OLIMPIA Snow,CNM

## 2021-06-16 PROBLEM — B02.9 HERPES ZOSTER WITHOUT COMPLICATION: Status: ACTIVE | Noted: 2019-07-16

## 2021-06-16 PROBLEM — Z12.4 PAP SMEAR FOR CERVICAL CANCER SCREENING: Status: ACTIVE | Noted: 2019-09-24

## 2021-06-16 PROBLEM — Z87.59 HISTORY OF LACTATION DISORDER: Status: ACTIVE | Noted: 2019-07-23

## 2021-06-16 NOTE — PROGRESS NOTES
6-week Postpartum Visit:     Assessment:   Normal postpartum exam at ~ 6 weeks.  Lactating- exclusively pumping  Contraceptive Counseling  Seasonal allergies    Plan:   1. Adjustment to parenting, self care and importance of a support system discussed. Postpartum education given including: postpartum mood changes and postpartum depression. EPDS of 6 today. Reports good support from siblings.   2. Return of fertility discussed. Plans NFP/cycle awareness for contraception. Education given on this method. Recommended condom use until return to regular menses. Resumption of intercourse reviewed with possible changes in libido and vaginal lubrication while lactating. Encouraged use of OTC water based lubricant.   3. Discussed resumption of exercise and normal timing of return to pre-pregnancy weight. Postpartum physical activity reviewed and encouraged modified abdominal crunches and Kegels daily. Encouraged integrating exercise, such as walking 20-30mns daily. Patient typically runs for exercise. Will work up to pre-pregnant exercise levels.   4.  Nutrition and supplements reviewed.  Advised continuation of a prenatal or multivitamin, also Vitamin D3 2000 IU geltab daily and an omega 3 fatty acid supplement. Discussed additional supplementation of Calcium and Vitamin D due to pediatrician recommending she try a dairy free diet to see if this will improve reflux symptoms in baby.   5. Reviewed warning signs of pelvic pain, excessive bleeding or abdominal pain, fever/chills, or signs of breast infection.   6. Breastfeeding support resource list and contact info given, including [facility], [facility], local LLL groups, and new moms groups. Warning signs of breast infections (mastitis and thrush) reviewed.  7. Pap with HPV: Due 2019 (NILM in 12/7/2016)  8. Discussed medication use for seasonal allergies in lactation, will likely use Flonase.    -RTC for routine health  maintenance or sooner as needed.     Juli Martin, RN, SNM  Patient was seen with student who was present for learning.  I personally assessed, examined and made clinical decisions reflected in the documentation. Raeann Lewis, OLIMPIA,CNM  TT with patient 40 mns, >50% time spent in counseling or coordination of care.     Subjective:    Barbara Walsh is a 27 y.o. female who presents for postpartum visit. She is 6 weeks postpartum following a NSVB.  I have fully reviewed the prenatal and intrapartum course. The delivery was at Term and 37w 5d weeks gestation Her baby girl is named Gali Galloway and weighed 6 lbs 0.7 oz at birth. Weighed baby at visit today and was 8lbs 12oz dressed. Reflections on her birth include feeling that it went well, is pleased with her experience, was pleased she was able to have an unmedicated birth.     Postpartum course has been stable. Baby's course has been stable with the exception of jaundice which was treated with blanca lights and formula supplementation. Jaundice has resolved and baby is now on exclusively pumped breast milk after frenulum being clipped did not improve latch. Is having symptoms of reflux which are being managed by peds. Lochia ceased at 2 weeks postpartum and resumed at ~5 weeks postpartum, was light, and lasted for 5 days.  Bowel function is normal. Had constipation which has now resolved, feels hemorrhoids are improving with use of topical ointment. Reviewed importance of hydration and sitz bath. Bladder function is normal. She has not resumed intercourse. Desired contraception: NFP/ cycle awareness/ condoms. Appetite is normal; stopped chicken diet after one week as she felt it was affecting her milk supply. Is now consuming a dairy-free diet at recommendation of peds due to reflux in baby. Reports sleeping well, 4 hour stretches overnight. [place]  Depression Scale postpartum depression screening score: 6.     She has not resumed regular exercise.  "Patient returns to work at 10 weeks postpartum. Will have a family friend provide in home .   She is wondering if it is safe to take Flonase while she is breastfeeding for seasonal allergies.     Review of Systems  -A 12 point comprehensive review of systems was negative except as noted above.  -Prenatal history and intrapartum course were also reviewed today.    Objective:      Vitals:    03/28/18 1006   BP: 102/60   Pulse: 80   Weight: 124 lb (56.2 kg)   Height: 4' 9\" (1.448 m)       Physical Exam:  General Appearance: Pleasant, articulate, well-groomed, well-nourished female.  Not in any apparent distress.   Breasts: Engorgement resolved/Lactating.  Nipples intact with no cracking.  Abdomen: Soft, non-tender. No masses.  Diastasis less than 1 fb.  Pelvic: External genitalia normal without lesions or irritation. Vagina and cervix show no lesions, inflammation, discharge or tenderness. 2nd degree perineal laceration well approximated and well healed. Uterus fully involuted.  No adnexal mass or tenderness. Pap smear due in 2019.   Extremities: Extremities normal without varicosities or edema    Last Pap: 12/7/2016. Results were: NILM, normal             "

## 2021-06-17 NOTE — PATIENT INSTRUCTIONS - HE
Patient Instructions by Renan Cleaning PA-C at 7/15/2019  8:00 AM     Author: Renan Cleaning PA-C Service: -- Author Type: Physician Assistant    Filed: 7/15/2019  9:39 AM Encounter Date: 7/15/2019 Status: Addendum    : Renan Cleaning PA-C (Physician Assistant)    Related Notes: Original Note by Renan Cleaning PA-C (Physician Assistant) filed at 7/15/2019  9:39 AM       Hot packs to the area 3 times per day.  Take antibiotics for cellulitis.  The antibiotic that you are taking should be safe in pregnancy.  It may cause diarrhea and you may use a probiotic or yogurt with active cultures to counteract the diarrhea.  Follow-up with your OB/GYN provider.  I would suggest a conversation for Lyme disease testing if you are concerned about potential Lyme disease.  Return to the ER if any complication or new symptoms arise.      Patient Education     Insect Bite  Insects most often bite to protect themselves or their nests. Certain bugs, like fleas and mosquitoes, bite to feed. In some cases, the actual bite causes no pain. An itchy red welt or swelling may develop at the site of the bite. Most insect bites do not cause illness. And the itching and swelling most often go away without treatment. However, an infection can develop if the bite is scratched and the skin broken. Rarely, a person may have an allergic reaction to an insect bite.  If a stinger is visible at the bite spot, remove it as quickly as possible, as this can decrease the amount of venom that gets into your body. Scrape it out with a dull edge, such as the edge of a credit card. Try not to squeeze it. Do not try to dig it out, as you may damage the skin and also increase the chance of infection.     To help reduce swelling and itching, apply a cold pack or ice in a zip-top plastic bag wrapped in a thin towel.   Home care    Your healthcare provider may prescribe over-the-counter medicines to help relieve itching and swelling. Use each medicine according to  the directions on the package. If the bite becomes infected, you will need an antibiotic. This may be in pill form taken by mouth or as an ointment or cream put directly on the skin. Be sure to use them exactly as prescribed.    Bite symptoms usually go away on their own within a week or two.    To help prevent infection, avoid scratching or picking at the bite.    To help relieve itching and swelling, apply ice in a zip-top plastic bag wrapped in a thin towel to the bites. Do this for up to 10 minutes at a time. Avoid hot showers or baths as these tend to make itching worse.    An over-the-counter anti-itch medicine such as calamine lotion or an antihistamine cream may be helpful.    If you suspect you have insects in your home, talk to a licensed pest-control professional. He or she can inspect your home and tell you how to get rid of bugs safely.  Follow-up care  Follow up with your healthcare provider, or as advised.  Call 911  Call 911 if any of these occur:    Trouble breathing or swallowing    Wheezing    Feeling like your throat is closing up    Fainting, loss of consciousness    Swelling around the face or mouth  When to seek medical advice  Call your healthcare provider right away if any of these occur:    Fever of 100.4 F (38 C) or higher, or as directed by your healthcare provider    Signs of infection, such as increased swelling and pain, warmth, red streaks, or drainage from the skin    Signs of allergic reaction, such as hives, a spreading rash, or throat itching  Date Last Reviewed: 10/1/2016    2389-8412 [employer]. [address] [place] [address], [place] [id number]. All rights reserved. This information is not intended as a substitute for professional medical care. Always follow your healthcare professional's instructions.           Patient Education     Discharge Instructions for Cellulitis  You have been diagnosed with cellulitis. This is an infection in the deepest layer of the skin. In  some cases, the infection also affects the muscle. Cellulitis is caused by bacteria. The bacteria can enter the body through broken skin. This can happen with a cut, scratch, animal bite, or an insect bite that has been scratched. You may have been treated in the hospital with antibiotics and fluids. You will likely be given a prescription for antibiotics to take at home. This sheet will help you take care of yourself at home.  Home care  When you are home:    Take the prescribed antibiotic medicine you are given as directed until it is gone. Take it even if you feel better. It treats the infection and stops it from returning. Not taking all the medicine can make future infections hard to treat.    Keep the infected area clean.    When possible, raise the infected area above the level of your heart. This helps keep swelling down.    Talk with your healthcare provider if you are in pain. Ask what kind of over-the-counter medicine you can take for pain.    Apply clean bandages as advised.    Take your temperature once a day for a week.    Wash your hands often to prevent spreading the infection.  In the future, wash your hands before and after you touch cuts, scratches, or bandages. This will help prevent infection.   When to call your healthcare provider  Call your healthcare provider immediately if you have any of the following:    Difficulty or pain when moving the joints above or below the infected area    Discharge or pus draining from the area    Fever of 100.4 F (38 C) or higher, or as directed by your healthcare provider    Pain that gets worse in or around the infected     Redness that gets worse in or around the infected area, particularly if the area of redness expands to a wider area    Shaking chills    Swelling of the infected area    Vomiting   Date Last Reviewed: 8/1/2016 2000-2017 [employer]. [address] [place] [id number]. All rights reserved. This information is not  intended as a substitute for professional medical care. Always follow your healthcare professional's instructions.

## 2021-06-22 NOTE — PROGRESS NOTES
"  Assessment/Plan:         1. Amenorrhea  Pregnancy, Urine   2. 8 weeks gestation of pregnancy  US OB < 14 Weeks With Transvaginal     The patient is currently 8 weeks pregnant based on LMP her expected date of delivery would be on 8/15/19.  I discussed continuing with a healthy lifestyle as well as taking her prenatal vitamin.  Scheduled an early ultrasound for the patient to have early pregnancy dating. Patient plans on following with the nurse midwives again.  She reports that she has a early pregnancy folder from her first pregnancy as well as the phone number to call to schedule this appointment.  I advised patient to follow-up or call the clinic if she is having any vaginal bleeding or experiencing any significant lower abdominal cramping.  She is active on my chart.  Plan scheduling first OB appointment in the next 2-4 weeks.       Subjective:      Barbara Walsh is a 28 y.o. female who presents for pregnancy confirmation. Her LMP was 11/8/18.  This is known to date.  The pregnancy was not necessarily planned but it is welcomed.  She has been feeling overall well. Intermittent nausea and fatigue. Occasional metallic taste is present. Compared to her last pregnancy she is feeling less nauseous but more fatigued. Her Daughter was born on 2/9/18. This was a healthy, uncomplicated pregnancy.    She is currently taking a prenatal vitamin.  She followed with the nurse midwives with her last pregnancy and would like to continue with the midwives for this pregnancy.  She denies any questions or concerns.  She denies any significant lower abdominal cramping or vaginal bleeding.    The following portions of the patient's history were reviewed and updated as appropriate: allergies, current medications and problem list.    Review of Systems   Pertinent items are noted in HPI.      Objective:      BP 98/70   Pulse 60   Resp 16   Ht 4' 9\" (1.448 m)   Wt 116 lb (52.6 kg)   LMP 11/08/2018   BMI 25.10 kg/m      Patient was " congratulated on her positive pregnancy test.  Overall she appears well and healthy.  No further physical exam was completed today given the nature of the visit.    Results for orders placed or performed in visit on 01/03/19   Pregnancy, Urine   Result Value Ref Range    Pregnancy Test, Urine Positive (!) Negative

## 2021-06-23 NOTE — PATIENT INSTRUCTIONS - HE
Pregnancy: Body Changes   From conception (fertilization) until after the birth of your child, you and your baby will change every day. To help you understand what is happening, we ve outlined how pregnancy begins and some of the changes you may notice.   How Pregnancy Begins   Conception is the union of a sperm and an egg. When it occurs, your baby s genetic makeup is complete, even its sex. Fertilization takes place in the fallopian tube. The fertilized egg then travels down this tube to the uterus (womb). The egg attaches to the lining of the uterus about a week later. There it grows and is nourished.   Your Changing Body   Pregnancy affects almost every part of your body. You may notice some of the following physical and emotional changes:   Your uterus expands outward and upward as your baby grows. You may feel pressure on your bladder, stomach, and other organs.   You may notice skin color changes on your forehead, nose, and cheeks. A dark line may form from your bellybutton down to your pubic area. The skin color around your nipples and thighs may also change.   Pink stretch marks may appear on your abdomen, breasts, or hips.   Your hair may seem thicker. You lose less hair during pregnancy.   You may feel fine one day and weepy the next. This is caused by changes in your body, such as increased hormones (chemicals that affect the function of certain organs and also your moods).  Adapting to Pregnancy: First Trimester   As your body adjusts, you may have to change or limit your daily activities. You ll need more rest. You may also need to use the energy you have more wisely.       Your Changing Body   Almost every part of your body is affected as you adapt to pregnancy. The uterus and cervix will begin to soften right away. You may not look very pregnant during the first three months. But you are likely to have some common signs of early pregnancy:   Nausea   Fatigue   Frequent urination   Mood swings    Bloating of the abdomen   Missed or light periods (first trimester bleeding)   Nipple or breast tenderness, breast swelling  It s Not Too Late to Start Good Habits   What matters most is protecting your baby from this moment on. If you smoke, drink alcohol, or use drugs, now is the time to stop. If you need help, talk with your health care provider.   Smoking increases the risk ofÂ Â stillbirthÂ or having a low-birth-weight baby. If you smoke, quit now.   Alcohol and drugs have been linked with miscarriage, birth defects, intellectual disability, and low birth weight. Do not drink alcohol or take drugs.  Tips to Relieve Nausea   Although nausea can occur at any time of the day, it may be worse in the morning. To help prevent nausea:   Eat small, light meals at frequent intervals.   Get up slowly. Eat a few unsalted crackers before you get out of bed.   Drink water with lemon slices.   Eat an ice popÂ in your favorite flavor.   Ask your health care provider about taking warren or vitamin B6 for nausea and vomiting.   Talk with your health care provider if you take vitamins that upset your stomach.  Work Concerns   The end of the first trimester is a good time to discuss working during pregnancy with your employer. Follow your health care provider s advice if your job requires you to stand for a long time, work with hazardous tools, or even sit at a desk all day. Your workspace, workload, or scheduled hours may need to be adjusted. Perhaps you can change body postures more often or take an extra break.   Advice for Travel   Talk to your health care provider first, but the second trimester may be the best time for any travel. You may be advised to avoid certain trips while you re pregnant. Food and water can be concerns in developing countries. Travel by car is a good choice, as you can stop, get out, and stretch. Bring snacks and water along. Fasten the lap belt below your belly, low over your hips. Also be sure to  "wear the shoulder harness.   Intimacy   Unless your health care provider tells you to, there is no reason to stop having sex while you re pregnant. You or your partner may notice changes in desire. Desire may be less in the first trimester, due to nausea and fatigue. In the second trimester, sex may be very enjoyable. The third trimester can be a challenge comfort-wise. Try different positions and see what s best for you both.   Pregnancy: Your First Trimester Changes   The first trimester is a time of rapid development for your baby. Because your baby is growing so quickly, it is important that you start a healthy lifestyle right away. By the end of the first trimester, your baby has formed all of its major body organs and weighs just over an ounce.       Actual size of baby is 1/4\"    Month 1 (Weeks 1-4)   The placenta (the organ that nourishes your baby) begins to form. The heart and lungs begin to develop. Your baby is about 1/4 inch long by the end of the first month.       Actual size of baby is 1\"    Month 2 (Weeks 5-8)   All of your baby s major body organs form. The face, fingers, toes, ears, and eyes appear. By the end of the month, your baby is about 1 inch long.       Actual size of baby is 4\"    Month 3 (Weeks 9-12)   Your baby can open and close its fists and mouth. The sexual organs begin to form. As the first trimester ends, your baby is about 4 inches long.       Pregnancy: Your Weight   Being a healthy weight is important for both you and your baby. The weight you gain now is not just extra fat. It is also the weight of your baby. And it is the increased blood and fluids to support the baby. A slow, steady rate of gain is best. How much you should gain depends on your weight before getting pregnant. Check with your health care provider to find out what is right for you.   If You Gain Too Much   Gaining too much weight might cause you to feel tired or you could have a harder pregnancy or birth. If " you and your health care provider decide you re gaining too much:   Eat fewer fats and sugars. Instead, eat fruit, vegetables, and whole-grain foods.   Drink plenty of water between meals.   Get at least 20 minutes of light exercise, such as walking, each day.   Don t diet. You might not get enough of the nutrients you or your baby needs.   Keep a diet diary to help you gauge what and how much you are eating .  If You re Not Gaining Enough   If you don t gain enough, your baby could be too small or have health problems. Women tend to gain most of their weight in the second and third trimesters. For now:   Eat many types of foods. Make sure you get enough calcium, protein, and carbohydrates.   Don t skip meals.   Eat healthy snacks.   Pick nutrient-dense, high calorie healthy food like trail mix or protein shakes.   See a dietitian for help.   Talk to your healthcare provider if you have had an eating disorder or problems with certain foods.  Pregnancy: Common Questions   There are plenty of myths and  old wives  tales  surrounding pregnancy. You may need help  fact from fiction. On this sheet, you ll find answers to a few common questions. If you have other questions, talk with your health care provider.   Will Working Harm My Baby?   In most cases, working throughout your pregnancy is not harmful at all. There may be concerns if the job involves dangerous machinery or chemicals, lifting, or standing for very long periods of time. Talk to your health care provider and employer about your particular job and pregnancy.   Why Can t I Change the Cat Litter Box?   Cats carry a disease called toxoplasmosis. In adult humans, it shows up as a mild infection of the blood and organs. If you are infected during pregnancy, the baby s brain and eyes could be damaged. To be safe, have someone else change the litter. If you must handle it, wear a paper mask over your nose and mouth. Also, wear gloves and wash your hands  afterward.   Which Medications Are Safe?   No prescription or over-the-counter drug is safe for everyone all of the time. But sometimes medications are needed. Be sure your health care provider knows you are pregnant. Then use only the medications he or she advises you to take.   Is It True That I Can Overheat My Baby?   Yes. To avoid making your baby too warm:   Don t sit in a jacuzzi. A long, warm bath is fine, but not in water over 100Â F.   Exercise less intensely if you feel fatigued. Base your workout on how you feel, not your heart rate. Heart rates aren t a good way to measure effort during pregnancy.  Can I Lift and Carry Safely?   Yes, if your health care provider doesn t tell you otherwise. Learn to lift and carry safely to avoid injury and reduce back pain during pregnancy. To protect your back:   Bend at the knees to bring the load nearer.   Get a good . Test the weight of the load.   Tighten your abdomen. Exhale as you lift.   Lift with your legs, not with your back.   Carry the load close to your body.   Hold the load so you can see where you are going.  What If I Get Sick?   Most women get sick at least once during pregnancy. Talk with your health care provider if you do. Most likely it will not affect your pregnancy. Get plenty of rest and fluids, and eat what you can. Talk to your health care provider before taking any medications.   [facility] Nurse Midwives - Contact information:   Appointment line and to get a hold of CNM in clinic Monday-Friday 8 am - 5 pm: [phone]   CNM on call answering service: [phone]. Specify hospital of choice and leave a brief message for CNM;  will then page CNM who is on call at your specified hospital and you should receive a call back with 15 minutes. Be sure that your ringer is audible and that you can accept blocked calls so that we can get back in touch with you! This number should bereservedfor urgent needs if during the day, after  hours, weekends, holidays   HEALTHY PREGNANCY CARE: 10-14 WEEKS PREGNANT   By weeks 10 to 14 of your pregnancy, the placenta has formed inside your uterus. It may be possible to hear your baby's heartbeat with a doppler ultrasound device. Your baby's eyes can and do move. The arms and legs can bend.   The second trimester genetic screening tests for Down's Syndrome, Trisomy 18, and neural tube defects (which are known collectively as a quad screen) are done at 15 to 22 weeks. It's your choice whether to have these tests. You and your partner can talk to your midwife or physician about birth defects tests.   Consider breastfeeding for the healthiest way to feed your baby. Ask your midwife or physician for more information.   As your center of gravity and weight changes, use good body mechanics when changing positions and lifting. For example, use a straight back and your legs for support when lifting instead of bending over. Maintain good posture to prevent straining your muscles. Now is a good time to continue or restart your exercise program. Walking 30-60 minutes daily is an excellent way to keep fit. Yoga and swimming also offer many benefits.   The nausea and fatigue of early pregnancy have usually started to let up, so this is a good time to focus on nutrition. Consider attending a nutrition class. A healthy diet includes about 60 grams of protein each day (3-4 servings of dairy, 2-3 servings of meat/fish/poultry/nuts), 4-6 servings of whole grain foods, and 5-6 servings of fruits and vegetables. Remember to drink 6-8 glasses of water daily.   Watch for warning signs, such as   vaginal bleeding   fluid leaking from your vagina   severe abdominal pain   nausea and vomiting more than 4-5 times a day, or if you are unable to keep anything down   fever more than 100.4 degrees F.   [facility] Nurse Midwives - Contact information:   Appointment line and to get a hold of CNBALAJI in clinic Monday-Friday 8 am - 5 pm: [phone]  [phone]. There are some clinics with early start times (1st appointment 7:20 am) and others with evening hours (last appointment 6:20 pm). Most are typically open from 8 am to 5 pm.   CNM on call answering service: [phone]. Specify your hospital of choice and leave a brief message for CNM;  will then page CNM who is on call at your specified hospital and you should receive a call back with 15 minutes. Be sure that your ringer is audible and that you can accept blocked calls so that we can get back in touch with you! This number should be reserved for urgent needs if during the day, before 8 am, after 5 pm, weekends, holidays.   RESOURCES   You can refer to the Starting Out Right book or find it online at http://[email or web address]/images/stories/[email or web address]-Starting-Out-Right.pdf or http://www.[facility].org/images/stories/flipbooks/[facility]-starting-out-right/[facility]-starting-out-right.html#p=8   You can sign up for a weekly parenting e-mail that gives support, tips and advice from health care professionals that starts with pregnancy and continues through the toddler years. To register, go to [email or web address] at any time during your pregnancy.   American College of Nurse-Midwives (ACNM) http://www.midwife.org/; look at the informational handouts at http://www.midwife.org/Share-With-Women   www.mymidwife.org   American pregnancy association - http://americanpregnancy.org   March of Dimes www.marchofdimes.com   FDA - Nutrition www.mypyramid.gov Under \"For Consumers,\" click on \"pregnant and breastfeeding women.\"   Vaccines : Centers for Disease Control and Prevention (CDC) http://www.cdc.gov/vaccines/   Centering Pregnancy (group prenatal care option): http://centeringhealthcare.org   Baby Center http://www.babycenter.com/   [facility] www.[facility].com   When researching information on the web, question the validity of websites. The domains .gov, .edu and.org tend to be more " reliable information. If there are a lot of advertisements, be cautious of the information provided. Stay away from blogs and chat rooms please!   Nutrition & supplements:   CONSIDER ATTENDING OUT FREE NUTRITION CLASS (FIRST Saturday of each month from 9-11 am). Call the midwife appointment line for details and to schedule   Prenatal vitamin (those with 600-1000 mcg folic acid and 27 mg of iron are enough). Take with food or Juice   4-5 servings of dairy or other calcium rich foods (fish, leafy greens, soy) per day - if not, take 500-1000 mg additional calcium (Tums, pills, chews). Take with dairy   Vitamin D3 6910-4991 IU geltab daily. Take with fattiest meal. Look for fortified foods also (Dairy, Juice)   2-3 (4) oz servings of fish, seafood, nuts (walnuts & almonds), oils, avocado per week - if not, take Omega 3 Fatty acids: DHA & MICHEAL 9149-6913 mg per day. Other names: cod liver oil, fish oil. Take with fattiest meal. Some prenatals have DHA, but typically not a sufficient dose.   Fish: Do not eat shark, swordfish, isaiah mackerel, or tilefish when you are pregnant or breastfeeding. They contain high levels of mercury. Limit white (albacore) tuna to no more than 6 ounces per week

## 2021-06-23 NOTE — PROGRESS NOTES
PRENATAL VISIT   FIRST OBSTETRICAL EXAM - OB    Assessment / Impression     First prenatal visit at 10w4d        Plan:     -IOB labs drawn today.  Pap test is due 2019.  -Discussed gonorrhea and Chlamydia screening, reviewed CDC recommendation to screen individuals over the age of 25 with the presence of risk factors, discussed risk factors patient does not have any and declines screening at this time.  -No indication to draw a lead level.  -Reviewed prenatal care schedule and discussed routine OB visits versus problem visits and referrals. Also discussed use of ultrasound in pregnancy.  -Optimal nutrition and weight gain discussed. Pregnancy weight gain of 15-25 lbs (BMI 25.0-29.9) encouraged.   -Reviewed importance of regular exercise in pregnancy and help with low back pain and other pregnancy symptoms.   -Discussed importance of taking aprenatal vitamin with the goal of having 400 mcg of folic acid. Additionally taking a Vitamin D supplement (1,000-2,000 IU/day) and an Omega 3/fish oil/DHA is beneficial and important for fetal eye and brain development.   -Anticipatory guidance for common pregnancy questions and concerns reviewed.   -Danger s/sx for this trimester reviewed with patient.  -Reviewed genetic carrier screening and genetic aneuploidy screening options.  Patient declines screening.  -Reviewed MyChart, lab results, and how lab results are disseminated.   -IOB packet given and reviewed with patient, discussed standards of care, scope of practice, clinic and hospital settings, also reviewed clinic versus emergency phone number.  -Discussed baby friend hospitals, policies, and recommendations regarding breastfeeding as well as professional and community support. Discussed the benefits of breastfeeding including: decreased childhood obesity or diabetes, decreased recurrence of ear infections, and decreased chance of hospitalization for respiratory conditions, patient plans to exclusively  breast-feed..   -Reviewed student involvement.  -[facility] services and hospital options reviewed; emergency and scheduling phone numbers given to patient. Discussed difference between emergency and scheduling line. Encouraged to call scheduling with all non-urgent inquiries.   -Discussed routine prenatal care versus problem visits in pregnancy and the billing implications.   -Return to clinic 4-6 weeks.    Total time spent with patient: 30 minutes, >50% time spent counseling and coordinating care.  Christy Villafana-Noble DOAN, [facility] nurse Midwives.   Subjective:    Barbara Aguirre Her is a 28 y.o.  here today for her First Obstetrical Exam.  This was an unplanned but desired pregnancy.  Patient shares that her cycles had returned and were monthly, she has a known last menstrual period so we will use this for dating.  Patient shares that she was exclusively pumping breast milk but stopped up on finding out she was pregnant as her milk supply also dwindled.  Divided breast milk for 10 months.  Patient shares that she is doing well, sleeps about 6 hours in a 24-hour.,  Currently thinly her daughter is teething so both she and her spouse are up quite frequently.  She reports that she feels more fatigue with this pregnancy but her nausea and vomiting is significantly better than with her first pregnancy.    Exercise: None  Safety: Unable to assess, spouse present.  Tobacco/Alcohol/Drug Use: Denies use.  EPDS today: 2  Sexual Partners: 1  Last Breast Exam: unsure. Desires today.     Education level: College  Occupation: Teacher  Partners name: Ronald Falk        Patient denies any history of  labor,  birth, gestational diabetes, gestational hypertension/pre-eclampsia, previous  section, previous uterine surgery/scar, postpartum hemorrhage, shoulder dystocia.     OB History    Para Term  AB Living   3 1 1 0 1 1   SAB TAB Ectopic Multiple Live Births   0 0 0 0 1      # Outcome Date GA Lbr  "Dariel/2nd Weight Sex Delivery Anes PTL Lv   3 Current            2 Term 02/09/18 37w5d 03:15 / 01:10 6 lb 0.7 oz (2.74 kg) F Vag-Spont None N IWONA   1 AB                   Expected Date of Delivery: 8/15/2019, by Last Menstrual Period    Past Medical History:   Diagnosis Date     Varicella      No past surgical history on file.  Social History     Tobacco Use     Smoking status: Never Smoker     Smokeless tobacco: Never Used   Substance Use Topics     Alcohol use: No     Drug use: No     Current Outpatient Medications   Medication Sig Dispense Refill     prenatal #115-iron-folic acid 29 mg iron- 1 mg Chew Chew daily.       acetaminophen (TYLENOL) 325 MG tablet Take 1-2 tablets (325-650 mg total) by mouth every 4 (four) hours as needed.  0     fluticasone (FLONASE) 50 mcg/actuation nasal spray 1 spray into each nostril daily.       No current facility-administered medications for this visit.      No Known Allergies          Pregnancy Risk Factors: None      Review of Systems  General:  Denies problem  Eyes: Denies problem  Ears/Nose/Throat: Denies problem  Cardiovascular: Denies problem  Respiratory:  Denies problem  Gastrointestinal:  Denies problem  Genitourinary: Denies problem  Musculoskeletal:  Denies problem  Skin: Denies problem  Neurologic: Denies problem  Psychiatric: Denies problem  Endocrine: Denies problem  Heme/Lymphatic: Denies problem   Allergic/Immunologic: Denies problem       Objective:   Objective    Vitals:    01/21/19 1111   BP: 104/60   Pulse: 84   Weight: 116 lb (52.6 kg)   Height: 4' 9\" (1.448 m)     Physical Exam:  General Appearance: Alert, cooperative, no distress, appears stated age  Head: Normocephalic, without obvious abnormality, atraumatic  Eyes: Conjunctiva/corneas clear, does not wear corrective lenses  Neck: Supple, symmetrical, trachea midline, no adenopathy  Thyroid: not enlarged, symmetric, no tenderness/mass/nodules  Back: Symmetric, no curvature, ROM normal, no CVA " tenderness  Lungs: Clear to auscultation bilaterally, respirations unlabored  Heart: Regular rate and rhythm, S1 and S2 normal, no murmur, rub, or gallop  Breasts: breasts appear normal, no suspicious masses, no skin or nipple changes or axillary nodes  Abdomen: Soft, non-tender, bowel sounds active all four quadrants, no masses.   FHT: 170s   Extremities: Extremities normal, atraumatic, no cyanosis or edema  Skin: Skin color, texture, turgor normal, no rashes or lesions  Lymph nodes: Cervical, supraclavicular, and axillary nodes normal  Neurologic: Alert and oriented x 3.    Lab:   Results for orders placed or performed in visit on 01/03/19   Pregnancy, Urine   Result Value Ref Range    Pregnancy Test, Urine Positive (!) Negative

## 2021-06-24 NOTE — PROGRESS NOTES
Barbara Aguirre presents to clinic with her partner and young daughter Barron today.  Barron is 1 year old.  Patient initially states she has not felt fetal movement but then tells me she is not sure.  On exam I saw a large movement in her abdomen and she connected this movement with fetal movement.  Reviewed initial OB labs, questions answered.  Pregnancy checklist updated.  Weight gain slightly less than recommended.  Patient has been active by walking at work and tells me she has been eating well.  Anatomy scan ordered at [facility].  I recommended she have the ultrasound done between 18 and 20 weeks.  Card to [facility] with phone number provided.  Return to clinic in 4-6 weeks.

## 2021-06-24 NOTE — PATIENT INSTRUCTIONS - HE
"  Patient Education   HEALTHY PREGNANCY CARE: 14 to 18 WEEKS PREGNANT    During this time, you may start to \"show,\" so that you look pregnant to people around you. You may also notice some changes in your skin, such as an increase in acne on your face. You may notice your heart pounding, a sharp stretching ache on either side of your lower abdomen (round ligament), and more vaginal discharge.     Your baby's nerves and muscles are maturing. Sex organs are recognizable. Your baby is now able to pass urine, and your baby's first stool (meconium) is starting to collect in his or her intestines. Hair is also beginning to grow on your baby's head. Your baby is moving freely inside your uterus but you may not be able to feel it until 18-20 weeks.    Continue making healthy choices for your baby during pregnancy, including good nutrition, exercise and a safe environment free from smoking, alcohol and drugs.    Your genetic screening with a quad screen blood test may have been done today.    Watch for warning signs and contact your midwife or physician at the clinic with any concerns at [facility] at Phone: [phone]. For example, call about cramping, bleeding, abdominal pain, watery vaginal discharge, or if you are unable to keep fluids down for more than 24 hours due to vomiting.   If it's after clinic hours, physician patients should call the [facility] at [phone] ([phone]); midwife patients should call their answering service at [phone].    How can you care for yourself at home?   You can refer to the Starting Out Right book or find it online at [email or web address] or [email or web address]     You can sign up for a weekly parenting e-mail that gives support, tips and advice from health care professionals that starts with pregnancy and " "continues through the toddler years. To register, go to [email or web address] at any time during your pregnancy.       Patient Education   HEALTHY PREGNANCY CARE: 18-22 WEEKS PREGNANT    Your baby is continuing to develop quickly. At this stage, babies can now suck their thumbs,  firmly with their hands, and are beginning to hear.    Sometime between 18 and 22 weeks, you will start to feel your baby move. At first, these small fetal movements feel like fluttering or \"butterflies.\" Some women say that they feel like gas bubbles. As the baby grows, these movements will become stronger and be able to be felt through your abdomen.     Nutrition: During this time, you may find that your nausea and fatigue are gone. Overall, you may feel better and have more energy than you did in your first trimester. Be sure you are getting enough calcium and iron in your diet. Your prenatal vitamins cannot supply all of the nutrients you need, so continue to eat 3-4 servings of dairy foods and 2-3 servings of meat/fish/poultry/nuts every day. Foods high in iron include: red meats, eggs, dark green vegetables, dark yellow vegetables, nuts, kidney beans and chickpeas. Some cereals are fortified with iron, so look at the food labels for 100% of the daily requirement for iron.     [employer] for childbirth and parenting classes, including an infant CPR class. Breastfeeding classes are recommended too.    Plan for the gestational diabetes screening between weeks 24-28. You can eat normally before that visit; we would suggest making sure you have protein foods, but not a lot of carbohydrates or sugary foods.    Your blood type was determined at your first OB appointment. If you are Rh negative, you should discuss the need for a Rhogam shot with your midwife or physician.     If you had a  birth in the past, discuss a trial of labor with your midwife or physician. He or she may ask that you obtain your operative report from that "  if you are wanting to have a vaginal birth after  () this time.     Think about the support you have, and what help you can plan on from family and friends, after your baby is born. Many mothers and babies are ready to go home from the hospital within a few days. Your clinic staff is available to assist you and the [facility] staff is available to you after hours. Start preparing your other children for changes they'll experience with the new baby. Explore day care options.    You may find that you have new discomforts now, such as sleep problems or leg cramps. To access information that can help you ease these discomforts, you can refer to the Starting Out Right book or find it online at [email or web address] or [email or web address]    You can sign up for a weekly parenting e-mail that gives support, tips and advice from health care professionals that starts with pregnancy and continues through the toddler years. To register, go to [email or web address] at any time during your pregnancy.    Watch for the warning signs of premature labor:     Dull, low backache    Contractions of the uterus, menstrual-like cramps    Abdominal cramping with or without diarrhea    More pelvic pressure    Increase or change in vaginal discharge.     Remember that labor doesn't have to hurt. Never hesitate to call your midwife or physician or their staff at [facility] at Phone: [phone] for any one of these warning signs - or if something just doesn't feel right. If it's after clinic hours, physician patients should call the [facility] at [phone]); midwife patients should call their answering service at [phone].

## 2021-06-26 ENCOUNTER — HEALTH MAINTENANCE LETTER (OUTPATIENT)
Age: 31
End: 2021-06-26

## 2021-06-27 NOTE — PROGRESS NOTES
Progress Notes by Belkis Rojas CNP at 7/15/2019  8:00 AM     Author: Belkis Rojas CNP Service: -- Author Type: Nurse Practitioner    Filed: 7/15/2019  9:55 AM Encounter Date: 7/15/2019 Status: Signed    : Renan Cleaning PA-C (Physician Assistant)       Chief Complaint   Patient presents with   ? Rash     x6d, was seen on Friday and told to come back if it didn't improve over the weekend, rash has gotten worse       ASSESSMENT & PLAN:   Diagnoses and all orders for this visit:    Rash and nonspecific skin eruption  -     amoxicillin-clavulanate (AUGMENTIN) 875-125 mg per tablet; Take 1 tablet by mouth 2 (two) times a day for 10 days.  Dispense: 20 tablet; Refill: 0    Bug bite, subsequent encounter  -     amoxicillin-clavulanate (AUGMENTIN) 875-125 mg per tablet; Take 1 tablet by mouth 2 (two) times a day for 10 days.  Dispense: 20 tablet; Refill: 0    Cellulitis, unspecified cellulitis site        MDM:    Supportive care discussed.  See discharge instructions below for specific recommendations given.    I had a discussion with the patient stating that we will continue with the previous treatment for bug bite to include topical steroid as previously written and over-the-counter antihistamine such as Zyrtec 1 time per day.  She was instructed not to use 2 antihistamines to include Benadryl or Zyrtec.  She may get too much antihistamine and suffer antihistamine effects.  Additionally hot packs 3 times per day and will treat for possible cellulitis based on the break in the skin from a arthropod assault.  She will have close follow-up with her primary care provider after 48 hours to recheck the progress with the hot packs and the antibiotic.  At that time she can also discuss utility of Lyme disease screening if she is not getting good resolution with this course of treatment.  Additionally I advised her that Augmentin is not a treatment for Lyme's disease.  Although I do not think she has any symptoms  and the rash does not look characteristic for ECM or for Lyme's.  Questions were answered to patient's satisfaction before discharge.    At the end of the encounter, I discussed results, diagnosis, medications. Discussed red flags for immediate return to clinic/ER, as well as indications for follow up if no improvement. Patient and/or caregiver understood and agreed to plan. Patient was stable for discharge.    SUBJECTIVE    HPI:  HPI  Barbara Aguirre Her presents to the walk-in clinic with worsening rash gated on scalp.  Was seen for the same thing on July 12.  Returns today due to worsening of rash.  Has been using triamcinolone topically and Zyrtec and Benadryl.  She has been using the Zyrtec during the daytime and Benadryl at nighttime.  She is 35 weeks pregnant.  He has not had fever chills night sweats or fatigue.  She did not identify the type of bug that bit her.  It was unwitnessed event but she surmised that these could be bug bites.  She is had increased swelling pain and itching.  No overt constitutional symptoms to include fever chills night sweats or fatigue no abdominal or pelvic pain or vaginal discharge.    History obtained from the patient.    Past Medical History:   Diagnosis Date   ? Varicella        Active Ambulatory (Non-Hospital) Problems    Diagnosis   ? Excessive weight gain during pregnancy in second trimester   ? Abnormal maternal glucose tolerance, antepartum   ? Need for rubella vaccination   ? Encounter for supervision of other normal pregnancy in third trimester   ? Environmental allergies         Social History     Tobacco Use   ? Smoking status: Never Smoker   ? Smokeless tobacco: Never Used   Substance Use Topics   ? Alcohol use: No       Review of Systems  As above in HPI, otherwise balance of Review of Systems are negative.    OBJECTIVE    Vitals:    07/15/19 0915   BP: 99/67   Patient Site: Right Arm   Patient Position: Sitting   Cuff Size: Adult Regular   Pulse: 87   Resp: 16   Temp:  98.2  F (36.8  C)   TempSrc: Oral   SpO2: 98%   Weight: 145 lb 9 oz (66 kg)       Physical Exam  General: Patient is resting comfortably no acute distress is afebrile  HEENT: Head is normocephalic atraumatic   eyes are PERRL EOMI sclera anicteric   TMs are clear bilaterally  Throat is clear  No cervical lymphadenopathy present  LUNGS: Clear to auscultation bilaterally  HEART: Regular rate and rhythm  Skin: With rash on the posterior lateral aspect of the neck as depicted in the picture below.  There are topical excoriations on the neck.              PATIENT INSTRUCTIONS:   Patient Instructions     Hot packs to the area 3 times per day.  Take antibiotics for cellulitis.  The antibiotic that you are taking should be safe in pregnancy.  It may cause diarrhea and you may use a probiotic or yogurt with active cultures to counteract the diarrhea.  Follow-up with your OB/GYN provider.  I would suggest a conversation for Lyme disease testing if you are concerned about potential Lyme disease.  Return to the ER if any complication or new symptoms arise.      Patient Education     Insect Bite  Insects most often bite to protect themselves or their nests. Certain bugs, like fleas and mosquitoes, bite to feed. In some cases, the actual bite causes no pain. An itchy red welt or swelling may develop at the site of the bite. Most insect bites do not cause illness. And the itching and swelling most often go away without treatment. However, an infection can develop if the bite is scratched and the skin broken. Rarely, a person may have an allergic reaction to an insect bite.  If a stinger is visible at the bite spot, remove it as quickly as possible, as this can decrease the amount of venom that gets into your body. Scrape it out with a dull edge, such as the edge of a credit card. Try not to squeeze it. Do not try to dig it out, as you may damage the skin and also increase the chance of infection.     To help reduce swelling and  itching, apply a cold pack or ice in a zip-top plastic bag wrapped in a thin towel.   Home care    Your healthcare provider may prescribe over-the-counter medicines to help relieve itching and swelling. Use each medicine according to the directions on the package. If the bite becomes infected, you will need an antibiotic. This may be in pill form taken by mouth or as an ointment or cream put directly on the skin. Be sure to use them exactly as prescribed.    Bite symptoms usually go away on their own within a week or two.    To help prevent infection, avoid scratching or picking at the bite.    To help relieve itching and swelling, apply ice in a zip-top plastic bag wrapped in a thin towel to the bites. Do this for up to 10 minutes at a time. Avoid hot showers or baths as these tend to make itching worse.    An over-the-counter anti-itch medicine such as calamine lotion or an antihistamine cream may be helpful.    If you suspect you have insects in your home, talk to a licensed pest-control professional. He or she can inspect your home and tell you how to get rid of bugs safely.  Follow-up care  Follow up with your healthcare provider, or as advised.  Call 911  Call 911 if any of these occur:    Trouble breathing or swallowing    Wheezing    Feeling like your throat is closing up    Fainting, loss of consciousness    Swelling around the face or mouth  When to seek medical advice  Call your healthcare provider right away if any of these occur:    Fever of 100.4 F (38 C) or higher, or as directed by your healthcare provider    Signs of infection, such as increased swelling and pain, warmth, red streaks, or drainage from the skin    Signs of allergic reaction, such as hives, a spreading rash, or throat itching  Date Last Reviewed: 10/1/2016    4291-1938 The [employer]. [address] [place] [address], [place] [address]. All rights reserved. This information is not intended as a substitute for professional medical  care. Always follow your healthcare professional's instructions.           Patient Education     Discharge Instructions for Cellulitis  You have been diagnosed with cellulitis. This is an infection in the deepest layer of the skin. In some cases, the infection also affects the muscle. Cellulitis is caused by bacteria. The bacteria can enter the body through broken skin. This can happen with a cut, scratch, animal bite, or an insect bite that has been scratched. You may have been treated in the hospital with antibiotics and fluids. You will likely be given a prescription for antibiotics to take at home. This sheet will help you take care of yourself at home.  Home care  When you are home:    Take the prescribed antibiotic medicine you are given as directed until it is gone. Take it even if you feel better. It treats the infection and stops it from returning. Not taking all the medicine can make future infections hard to treat.    Keep the infected area clean.    When possible, raise the infected area above the level of your heart. This helps keep swelling down.    Talk with your healthcare provider if you are in pain. Ask what kind of over-the-counter medicine you can take for pain.    Apply clean bandages as advised.    Take your temperature once a day for a week.    Wash your hands often to prevent spreading the infection.  In the future, wash your hands before and after you touch cuts, scratches, or bandages. This will help prevent infection.   When to call your healthcare provider  Call your healthcare provider immediately if you have any of the following:    Difficulty or pain when moving the joints above or below the infected area    Discharge or pus draining from the area    Fever of 100.4 F (38 C) or higher, or as directed by your healthcare provider    Pain that gets worse in or around the infected     Redness that gets worse in or around the infected area, particularly if the area of redness expands to a  wider area    Shaking chills    Swelling of the infected area    Vomiting   Date Last Reviewed: 8/1/2016 2000-2017 [employer]. [address] [place] [address], [place] [address]. All rights reserved. This information is not intended as a substitute for professional medical care. Always follow your healthcare professional's instructions.

## 2021-07-14 PROBLEM — O42.90 LEAKAGE OF AMNIOTIC FLUID: Status: RESOLVED | Noted: 2019-08-13 | Resolved: 2019-09-24

## 2021-07-14 PROBLEM — O99.012 ANEMIA AFFECTING PREGNANCY IN SECOND TRIMESTER: Status: RESOLVED | Noted: 2017-12-05 | Resolved: 2018-02-11

## 2021-07-14 PROBLEM — Z34.90 PREGNANT: Status: RESOLVED | Noted: 2019-08-13 | Resolved: 2019-09-24

## 2021-07-14 PROBLEM — O99.810 ABNORMAL MATERNAL GLUCOSE TOLERANCE, ANTEPARTUM: Status: RESOLVED | Noted: 2019-05-10 | Resolved: 2019-09-24

## 2021-07-14 PROBLEM — Z37.9 NORMAL LABOR: Status: RESOLVED | Noted: 2018-02-09 | Resolved: 2018-02-09

## 2021-07-14 PROBLEM — O26.02 EXCESSIVE WEIGHT GAIN DURING PREGNANCY IN SECOND TRIMESTER: Status: RESOLVED | Noted: 2019-05-11 | Resolved: 2019-09-24

## 2021-07-14 PROBLEM — Z34.00 SUPERVISION OF NORMAL FIRST PREGNANCY: Status: RESOLVED | Noted: 2017-07-31 | Resolved: 2018-03-28

## 2021-07-14 PROBLEM — Z34.90 PREGNANT: Status: RESOLVED | Noted: 2018-02-09 | Resolved: 2018-02-09

## 2021-10-16 ENCOUNTER — HEALTH MAINTENANCE LETTER (OUTPATIENT)
Age: 31
End: 2021-10-16

## 2022-02-17 PROBLEM — Z34.83 ENCOUNTER FOR SUPERVISION OF OTHER NORMAL PREGNANCY IN THIRD TRIMESTER: Status: RESOLVED | Noted: 2019-01-21 | Resolved: 2019-09-24

## 2022-07-23 ENCOUNTER — HEALTH MAINTENANCE LETTER (OUTPATIENT)
Age: 32
End: 2022-07-23

## 2022-10-01 ENCOUNTER — HEALTH MAINTENANCE LETTER (OUTPATIENT)
Age: 32
End: 2022-10-01

## 2023-08-06 ENCOUNTER — HEALTH MAINTENANCE LETTER (OUTPATIENT)
Age: 33
End: 2023-08-06